# Patient Record
Sex: FEMALE | Race: WHITE | Employment: STUDENT | URBAN - METROPOLITAN AREA
[De-identification: names, ages, dates, MRNs, and addresses within clinical notes are randomized per-mention and may not be internally consistent; named-entity substitution may affect disease eponyms.]

---
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
## 2022-01-05 ENCOUNTER — E-VISIT (OUTPATIENT)
Dept: FAMILY MEDICINE CLINIC | Age: 22
End: 2022-01-05
Payer: COMMERCIAL

## 2022-01-05 DIAGNOSIS — B00.2 ORAL HERPES: Primary | ICD-10-CM

## 2022-01-05 PROCEDURE — 99423 OL DIG E/M SVC 21+ MIN: CPT | Performed by: FAMILY MEDICINE

## 2022-01-05 RX ORDER — ACYCLOVIR 50 MG/G
OINTMENT TOPICAL
Qty: 1 EACH | Refills: 1 | Status: SHIPPED | OUTPATIENT
Start: 2022-01-05 | End: 2022-01-12

## 2022-01-05 RX ORDER — VALACYCLOVIR HYDROCHLORIDE 1 G/1
1000 TABLET, FILM COATED ORAL 2 TIMES DAILY
Qty: 10 TABLET | Refills: 0 | Status: SHIPPED | OUTPATIENT
Start: 2022-01-05 | End: 2022-01-10

## 2022-01-05 NOTE — PROGRESS NOTES
HPI: per patient's questionnaire    EXAM: not applicable    Diagnoses and all orders for this visit:    1. Oral herpes    - valACYclovir (VALTREX) 1 g tablet; Take 1 tablet by mouth 2 times daily for 5 days  Dispense: 10 tablet; Refill: 0  - acyclovir (ZOVIRAX) 5 % ointment; Apply topically 5 times daily. Dispense: 1 each; Refill: 1      No orders of the defined types were placed in this encounter. Patient was advised to contact PCP if symptoms worsen or failing to change as expected        20 -30 minutes were spent on the digital evaluation and management of this patient.   Electronically signed by Milton Samano MD on 1/5/22 at  1:04 PM

## 2022-03-19 ENCOUNTER — APPOINTMENT (OUTPATIENT)
Dept: CT IMAGING | Age: 22
End: 2022-03-19
Payer: COMMERCIAL

## 2022-03-19 ENCOUNTER — HOSPITAL ENCOUNTER (EMERGENCY)
Age: 22
Discharge: HOME | End: 2022-03-19
Attending: EMERGENCY MEDICINE
Payer: COMMERCIAL

## 2022-03-19 VITALS
SYSTOLIC BLOOD PRESSURE: 140 MMHG | BODY MASS INDEX: 29.77 KG/M2 | DIASTOLIC BLOOD PRESSURE: 91 MMHG | WEIGHT: 168 LBS | RESPIRATION RATE: 15 BRPM | TEMPERATURE: 98.9 F | HEIGHT: 63 IN | HEART RATE: 84 BPM | OXYGEN SATURATION: 97 %

## 2022-03-19 DIAGNOSIS — R10.11 ABDOMINAL PAIN, RIGHT UPPER QUADRANT: ICD-10-CM

## 2022-03-19 DIAGNOSIS — N39.0 URINARY TRACT INFECTION IN FEMALE: Primary | ICD-10-CM

## 2022-03-19 LAB
ABSOLUTE EOS #: 0.2 K/UL (ref 0–0.4)
ABSOLUTE LYMPH #: 1.8 K/UL (ref 1.2–5.2)
ABSOLUTE MONO #: 0.4 K/UL (ref 0.1–1.3)
ALBUMIN SERPL-MCNC: 4.3 G/DL (ref 3.5–5.2)
ALP BLD-CCNC: 43 U/L (ref 35–104)
ALT SERPL-CCNC: 13 U/L (ref 5–33)
AMORPHOUS: ABNORMAL
ANION GAP SERPL CALCULATED.3IONS-SCNC: 11 MMOL/L (ref 9–17)
AST SERPL-CCNC: 15 U/L
BACTERIA: ABNORMAL
BASOPHILS # BLD: 1 % (ref 0–2)
BASOPHILS ABSOLUTE: 0.1 K/UL (ref 0–0.2)
BILIRUB SERPL-MCNC: 0.18 MG/DL (ref 0.3–1.2)
BILIRUBIN URINE: NEGATIVE
BUN BLDV-MCNC: 7 MG/DL (ref 6–20)
CALCIUM SERPL-MCNC: 9.2 MG/DL (ref 8.6–10.4)
CASTS UA: ABNORMAL /LPF
CHLORIDE BLD-SCNC: 102 MMOL/L (ref 98–107)
CO2: 25 MMOL/L (ref 20–31)
COLOR: YELLOW
CREAT SERPL-MCNC: 0.62 MG/DL (ref 0.5–0.9)
EOSINOPHILS RELATIVE PERCENT: 3 % (ref 0–4)
EPITHELIAL CELLS UA: ABNORMAL /HPF
GFR NON-AFRICAN AMERICAN: ABNORMAL ML/MIN
GFR SERPL CREATININE-BSD FRML MDRD: ABNORMAL ML/MIN/{1.73_M2}
GLUCOSE BLD-MCNC: 110 MG/DL (ref 70–99)
GLUCOSE URINE: NEGATIVE
HCG(URINE) PREGNANCY TEST: NEGATIVE
HCT VFR BLD CALC: 36 % (ref 36–46)
HEMOGLOBIN: 12.3 G/DL (ref 12–16)
KETONES, URINE: NEGATIVE
LEUKOCYTE ESTERASE, URINE: ABNORMAL
LIPASE: 30 U/L (ref 13–60)
LYMPHOCYTES # BLD: 29 % (ref 25–45)
MCH RBC QN AUTO: 30.7 PG (ref 26–34)
MCHC RBC AUTO-ENTMCNC: 34 G/DL (ref 31–37)
MCV RBC AUTO: 90.4 FL (ref 80–100)
MONOCYTES # BLD: 6 % (ref 2–8)
NITRITE, URINE: NEGATIVE
PDW BLD-RTO: 12.2 % (ref 11.5–14.9)
PH UA: 7.5 (ref 5–8)
PLATELET # BLD: 385 K/UL (ref 150–450)
PMV BLD AUTO: 6.5 FL (ref 6–12)
POTASSIUM SERPL-SCNC: 3.9 MMOL/L (ref 3.7–5.3)
PROTEIN UA: NEGATIVE
RBC # BLD: 3.99 M/UL (ref 4–5.2)
RBC UA: ABNORMAL /HPF
SEG NEUTROPHILS: 61 % (ref 34–64)
SEGMENTED NEUTROPHILS ABSOLUTE COUNT: 3.7 K/UL (ref 1.3–9.1)
SODIUM BLD-SCNC: 138 MMOL/L (ref 135–144)
SPECIFIC GRAVITY UA: 1.01 (ref 1–1.03)
TOTAL PROTEIN: 7.3 G/DL (ref 6.4–8.3)
TURBIDITY: ABNORMAL
URINE HGB: NEGATIVE
UROBILINOGEN, URINE: NORMAL
WBC # BLD: 6.1 K/UL (ref 4.5–13.5)
WBC UA: ABNORMAL /HPF

## 2022-03-19 PROCEDURE — 83690 ASSAY OF LIPASE: CPT

## 2022-03-19 PROCEDURE — 81001 URINALYSIS AUTO W/SCOPE: CPT

## 2022-03-19 PROCEDURE — 6360000004 HC RX CONTRAST MEDICATION: Performed by: EMERGENCY MEDICINE

## 2022-03-19 PROCEDURE — 74177 CT ABD & PELVIS W/CONTRAST: CPT

## 2022-03-19 PROCEDURE — 6360000002 HC RX W HCPCS: Performed by: EMERGENCY MEDICINE

## 2022-03-19 PROCEDURE — 85025 COMPLETE CBC W/AUTO DIFF WBC: CPT

## 2022-03-19 PROCEDURE — 36415 COLL VENOUS BLD VENIPUNCTURE: CPT

## 2022-03-19 PROCEDURE — 96374 THER/PROPH/DIAG INJ IV PUSH: CPT

## 2022-03-19 PROCEDURE — 96375 TX/PRO/DX INJ NEW DRUG ADDON: CPT

## 2022-03-19 PROCEDURE — 81025 URINE PREGNANCY TEST: CPT

## 2022-03-19 PROCEDURE — 99284 EMERGENCY DEPT VISIT MOD MDM: CPT

## 2022-03-19 PROCEDURE — 80053 COMPREHEN METABOLIC PANEL: CPT

## 2022-03-19 PROCEDURE — 2580000003 HC RX 258: Performed by: EMERGENCY MEDICINE

## 2022-03-19 RX ORDER — 0.9 % SODIUM CHLORIDE 0.9 %
1000 INTRAVENOUS SOLUTION INTRAVENOUS ONCE
Status: COMPLETED | OUTPATIENT
Start: 2022-03-19 | End: 2022-03-19

## 2022-03-19 RX ORDER — ONDANSETRON 2 MG/ML
4 INJECTION INTRAMUSCULAR; INTRAVENOUS ONCE
Status: COMPLETED | OUTPATIENT
Start: 2022-03-19 | End: 2022-03-19

## 2022-03-19 RX ORDER — 0.9 % SODIUM CHLORIDE 0.9 %
80 INTRAVENOUS SOLUTION INTRAVENOUS ONCE
Status: COMPLETED | OUTPATIENT
Start: 2022-03-19 | End: 2022-03-19

## 2022-03-19 RX ORDER — SODIUM CHLORIDE 0.9 % (FLUSH) 0.9 %
10 SYRINGE (ML) INJECTION PRN
Status: DISCONTINUED | OUTPATIENT
Start: 2022-03-19 | End: 2022-03-19 | Stop reason: HOSPADM

## 2022-03-19 RX ORDER — KETOROLAC TROMETHAMINE 30 MG/ML
30 INJECTION, SOLUTION INTRAMUSCULAR; INTRAVENOUS ONCE
Status: COMPLETED | OUTPATIENT
Start: 2022-03-19 | End: 2022-03-19

## 2022-03-19 RX ADMIN — ONDANSETRON 4 MG: 2 INJECTION, SOLUTION INTRAMUSCULAR; INTRAVENOUS at 02:41

## 2022-03-19 RX ADMIN — KETOROLAC TROMETHAMINE 30 MG: 30 INJECTION, SOLUTION INTRAMUSCULAR; INTRAVENOUS at 02:41

## 2022-03-19 RX ADMIN — SODIUM CHLORIDE 80 ML: 9 INJECTION, SOLUTION INTRAVENOUS at 04:26

## 2022-03-19 RX ADMIN — SODIUM CHLORIDE 1000 ML: 9 INJECTION, SOLUTION INTRAVENOUS at 02:41

## 2022-03-19 RX ADMIN — SODIUM CHLORIDE, PRESERVATIVE FREE 10 ML: 5 INJECTION INTRAVENOUS at 04:26

## 2022-03-19 RX ADMIN — IOPAMIDOL 75 ML: 755 INJECTION, SOLUTION INTRAVENOUS at 04:26

## 2022-03-19 ASSESSMENT — ENCOUNTER SYMPTOMS
NAUSEA: 0
SORE THROAT: 0
CONSTIPATION: 0
SHORTNESS OF BREATH: 0
ABDOMINAL PAIN: 1
TROUBLE SWALLOWING: 0
COLOR CHANGE: 0
COUGH: 0
BACK PAIN: 0
DIARRHEA: 0
VOMITING: 0
BLOOD IN STOOL: 0

## 2022-03-19 ASSESSMENT — PAIN DESCRIPTION - LOCATION: LOCATION: ABDOMEN

## 2022-03-19 ASSESSMENT — PAIN - FUNCTIONAL ASSESSMENT: PAIN_FUNCTIONAL_ASSESSMENT: 0-10

## 2022-03-19 ASSESSMENT — PAIN SCALES - GENERAL
PAINLEVEL_OUTOF10: 3
PAINLEVEL_OUTOF10: 3

## 2022-03-19 ASSESSMENT — PAIN DESCRIPTION - FREQUENCY: FREQUENCY: INTERMITTENT

## 2022-03-19 ASSESSMENT — PAIN DESCRIPTION - ORIENTATION: ORIENTATION: RIGHT

## 2022-03-19 ASSESSMENT — PAIN DESCRIPTION - PAIN TYPE: TYPE: ACUTE PAIN

## 2022-03-19 NOTE — ED PROVIDER NOTES
[address] [facility]  EMERGENCY DEPARTMENT ENCOUNTER    Pt Name: Deysi Ramírez  MRN: [id number]  YOB: 2000  Date of evaluation:3/19/22  PCP: Maria Luisa Herrera MD    [address]       Chief Complaint   Patient presents with    Abdominal Pain     patient has complaints of intermittent RUQ pain       HISTORY OF PRESENT ILLNESS    Deysi Ramírez is a 25 y.o. female who presents with a chief complaint of right upper quadrant abdominal pain. Patient states around 9:00 she developed the pain while she was going to bed. It ended up going away at that time but returned again later tonight. No nausea or vomiting. Pain is sharp and does not radiate. Nothing makes it better or worse. No changes in bowel or bladder habits. She did eat [employer] for dinner tonight. Her mother had problems with her gallbladder ended up having to have it removed. Symptoms are acute. Symptoms are moderate. Patient has no other complaints at this time. REVIEW OF SYSTEMS       Review of Systems   Constitutional: Negative for chills, fatigue and fever. HENT: Negative for congestion, ear pain, sore throat and trouble swallowing. Eyes: Negative for visual disturbance. Respiratory: Negative for cough and shortness of breath. Cardiovascular: Negative for chest pain, palpitations and leg swelling. Gastrointestinal: Positive for abdominal pain. Negative for blood in stool, constipation, diarrhea, nausea and vomiting. Genitourinary: Negative for dysuria and flank pain. Musculoskeletal: Negative for arthralgias, back pain, myalgias and neck pain. Skin: Negative for color change, rash and wound. Neurological: Negative for dizziness, weakness, light-headedness, numbness and headaches. Psychiatric/Behavioral: Negative for confusion. All other systems reviewed and are negative. Negative in 10 essential Systems except as mentioned above and in the HPI.         PAST MEDICAL HISTORY   No past medical history on file. None    SURGICAL HISTORY      has no past surgical history on file. None    CURRENT MEDICATIONS       Current Discharge Medication List      CONTINUE these medications which have NOT CHANGED    Details   norethindrone-ethinyl estradiol (EDI FE 1/20) 1-20 MG-MCG per tablet take 1 tablet by mouth once daily  Qty: 28 tablet, Refills: 11    Associated Diagnoses: Surveillance for birth control, oral contraceptives      doxycycline monohydrate (ADOXA) 100 MG tablet Take 1 tablet by mouth 2 times daily With food  Qty: 20 tablet, Refills: 0    Associated Diagnoses: Carbuncle             ALLERGIES     has No Known Allergies. FAMILY HISTORY     She indicated that the status of her other is unknown.     family history includes Diabetes in an other family member; High Blood Pressure in an other family member. Cholecystectomy in mother    SOCIAL HISTORY      reports that she has never smoked. She has never used smokeless tobacco. She reports that she does not drink alcohol and does not use drugs. PHYSICAL EXAM     INITIAL VITALS:  height is 5' 3\" (1.6 m) and weight is 168 lb (76.2 kg). Her oral temperature is 98.9 °F (37.2 °C). Her blood pressure is 140/91 (abnormal) and her pulse is 84. Her respiration is 15 and oxygen saturation is 97%. Physical Exam  Vitals and nursing note reviewed. Constitutional:       General: She is not in acute distress. HENT:      Head: Normocephalic and atraumatic. Eyes:      Conjunctiva/sclera: Conjunctivae normal.      Pupils: Pupils are equal, round, and reactive to light. Cardiovascular:      Rate and Rhythm: Normal rate and regular rhythm. Heart sounds: Normal heart sounds. No murmur heard. Pulmonary:      Effort: Pulmonary effort is normal. No respiratory distress. Breath sounds: Normal breath sounds. Abdominal:      General: Bowel sounds are normal. There is no distension. Palpations: Abdomen is soft. Tenderness:  There is abdominal tenderness in the right upper quadrant. Musculoskeletal:         General: No tenderness. Cervical back: Neck supple. Lymphadenopathy:      Cervical: No cervical adenopathy. Skin:     General: Skin is warm and dry. Findings: No rash. Neurological:      Mental Status: She is alert and oriented to person, place, and time. Psychiatric:         Judgment: Judgment normal.           DIFFERENTIAL DIAGNOSIS/MDM:   79-year-old female presents with right upper quadrant abdominal pain since this evening. She is afebrile, nontoxic, normal vital signs. No acute distress. Has mild tenderness to deep palpation in her right upper quadrant. No peritoneal signs. Differential includes biliary colic, cholecystitis, pancreatitis, gastritis. We will get labs, CT abdomen pelvis    DIAGNOSTIC RESULTS     EKG: All EKG's are interpreted by the Emergency Department Physician who either signs or Co-signs this chart in the absence of a cardiologist.        RADIOLOGY:   I directly visualized the following  images and reviewed the radiologist interpretations:  CT ABDOMEN PELVIS W IV CONTRAST Additional Contrast? None   Final Result   No acute abnormality identified.                  ED BEDSIDE ULTRASOUND:      LABS:  Labs Reviewed   CBC WITH AUTO DIFFERENTIAL - Abnormal; Notable for the following components:       Result Value    RBC 3.99 (*)     All other components within normal limits   COMPREHENSIVE METABOLIC PANEL - Abnormal; Notable for the following components:    Glucose 110 (*)     Total Bilirubin 0.18 (*)     All other components within normal limits   URINALYSIS WITH REFLEX TO CULTURE - Abnormal; Notable for the following components:    Turbidity UA Cloudy (*)     Leukocyte Esterase, Urine MOD (*)     All other components within normal limits   MICROSCOPIC URINALYSIS - Abnormal; Notable for the following components:    Bacteria, UA MODERATE (*)     Amorphous, UA 2+ (*)     All other components within normal limits   LIPASE   PREGNANCY, URINE         EMERGENCY DEPARTMENT COURSE:   Vitals:    Vitals:    03/19/22 0222   BP: (!) 140/91   Pulse: 84   Resp: 15   Temp: 98.9 °F (37.2 °C)   TempSrc: Oral   SpO2: 97%   Weight: 168 lb (76.2 kg)   Height: 5' 3\" (1.6 m)     Labs are unremarkable other than urine. She is found to have a UTI. When asked patient brought that she states she is actually been taking antibiotics that she had at home for UTI. States she has been on it for 2 days and has 3 more days left. Her CT scan is normal.  No evidence of biliary stones. No evidence of appendicitis. Advised her to finish the course of antibiotics, return if any symptoms worsen or if her symptoms do not begin to improve. She is agreeable plan will be discharged home today. CRITICALCARE:      CONSULTS:  None      PROCEDURES:      FINAL IMPRESSION      1. Urinary tract infection in female    2. Abdominal pain, right upper quadrant            DISPOSITION/PLAN   DISPOSITION Decision To Discharge 03/19/2022 04:47:18 AM          PATIENT REFERRED TO:  Lauro Salmeron MD  [address] [facility] [address]  [address] [address] [phone]    Schedule an appointment as soon as possible for a visit       [facility] ED  [facility] [address]  [address] [facility]  [phone]    As needed, If symptoms worsen      DISCHARGE MEDICATIONS:  Current Discharge Medication List          The care is provided during an unprecedented national emergency due to the novel coronavirus, COVID-19.     (Please note that portions ofthis note were completed with a voice recognition program.  Efforts were made to edit the dictations but occasionally words are mis-transcribed.)    Eliz Higgins DO  Attending Emergency Physician          Eliz Higgins DO  03/19/22 [id number]

## 2022-05-18 ENCOUNTER — OFFICE VISIT (OUTPATIENT)
Dept: FAMILY MEDICINE CLINIC | Age: 22
End: 2022-05-18
Payer: COMMERCIAL

## 2022-05-18 VITALS
HEIGHT: 63 IN | SYSTOLIC BLOOD PRESSURE: 118 MMHG | OXYGEN SATURATION: 98 % | WEIGHT: 179 LBS | TEMPERATURE: 98.4 F | HEART RATE: 82 BPM | DIASTOLIC BLOOD PRESSURE: 80 MMHG | BODY MASS INDEX: 31.71 KG/M2

## 2022-05-18 DIAGNOSIS — R10.11 RIGHT UPPER QUADRANT ABDOMINAL PAIN: Primary | ICD-10-CM

## 2022-05-18 DIAGNOSIS — K21.9 GASTROESOPHAGEAL REFLUX DISEASE WITHOUT ESOPHAGITIS: ICD-10-CM

## 2022-05-18 PROCEDURE — 99214 OFFICE O/P EST MOD 30 MIN: CPT | Performed by: FAMILY MEDICINE

## 2022-05-18 RX ORDER — OMEPRAZOLE 20 MG/1
20 CAPSULE, DELAYED RELEASE ORAL DAILY
Qty: 120 CAPSULE | Refills: 1 | Status: SHIPPED | OUTPATIENT
Start: 2022-05-18 | End: 2022-09-28 | Stop reason: DRUGHIGH

## 2022-05-18 SDOH — ECONOMIC STABILITY: FOOD INSECURITY: WITHIN THE PAST 12 MONTHS, THE FOOD YOU BOUGHT JUST DIDN'T LAST AND YOU DIDN'T HAVE MONEY TO GET MORE.: NEVER TRUE

## 2022-05-18 SDOH — ECONOMIC STABILITY: FOOD INSECURITY: WITHIN THE PAST 12 MONTHS, YOU WORRIED THAT YOUR FOOD WOULD RUN OUT BEFORE YOU GOT MONEY TO BUY MORE.: NEVER TRUE

## 2022-05-18 ASSESSMENT — ENCOUNTER SYMPTOMS
SHORTNESS OF BREATH: 0
RECTAL PAIN: 0
ABDOMINAL PAIN: 1
SINUS PRESSURE: 0
COUGH: 0
TROUBLE SWALLOWING: 0
BACK PAIN: 0
STRIDOR: 0
SORE THROAT: 0
ABDOMINAL DISTENTION: 1
RHINORRHEA: 0
DIARRHEA: 1
NAUSEA: 1
COLOR CHANGE: 0
VOMITING: 0
CHEST TIGHTNESS: 0
CONSTIPATION: 0
BLOOD IN STOOL: 0
WHEEZING: 0

## 2022-05-18 ASSESSMENT — PATIENT HEALTH QUESTIONNAIRE - PHQ9
SUM OF ALL RESPONSES TO PHQ QUESTIONS 1-9: 0
SUM OF ALL RESPONSES TO PHQ9 QUESTIONS 1 & 2: 0
SUM OF ALL RESPONSES TO PHQ QUESTIONS 1-9: 0
1. LITTLE INTEREST OR PLEASURE IN DOING THINGS: 0
SUM OF ALL RESPONSES TO PHQ QUESTIONS 1-9: 0
2. FEELING DOWN, DEPRESSED OR HOPELESS: 0
SUM OF ALL RESPONSES TO PHQ QUESTIONS 1-9: 0

## 2022-05-18 ASSESSMENT — SOCIAL DETERMINANTS OF HEALTH (SDOH): HOW HARD IS IT FOR YOU TO PAY FOR THE VERY BASICS LIKE FOOD, HOUSING, MEDICAL CARE, AND HEATING?: NOT VERY HARD

## 2022-05-18 NOTE — PROGRESS NOTES
Visit Information    Have you changed or started any medications since your last visit including any over-the-counter medicines, vitamins, or herbal medicines? no   Have you stopped taking any of your medications? Is so, why? -  no  Are you having any side effects from any of your medications? - no    Have you seen any other physician or provider since your last visit? yes   Have you had any other diagnostic tests since your last visit? yes    Have you been seen in the emergency room and/or had an admission in a hospital since we last saw you?  yes    Have you had your routine dental cleaning in the past 6 months?  yes      Do you have an active MyChart account? If no, what is the barrier?   Yes    Patient Care Team:  Real Beltran MD as PCP - General (Family Medicine)  Real Beltran MD as PCP - [facility]    Medical History Review  Past Medical, Family, and Social History reviewed and does contribute to the patient presenting condition    Health Maintenance   Topic Date Due    COVID-19 Vaccine (1) Never done    HIV screen  Never done    Chlamydia screen  Never done    Hepatitis C screen  Never done    Flu vaccine (Season Ended) 09/01/2022    Depression Screen  10/26/2022    DTaP/Tdap/Td vaccine (6 - Tdap) 08/04/2024    Hepatitis A vaccine  Completed    Hepatitis B vaccine  Completed    Hib vaccine  Completed    HPV vaccine  Completed    Varicella vaccine  Completed    Meningococcal (ACWY) vaccine  Completed    Pneumococcal 0-64 years Vaccine  Completed

## 2022-05-18 NOTE — PROGRESS NOTES
Chief Complaint   Patient presents with    Abdominal Pain     random gallbladder pain. About a month ago she was in the ER for the pain, she has been watching what she is eating. Happens in the middle of the night, and stays all night     Other     happens about once a week at least          [facility]  here today for follow up on chronic medical problems, go over labs and/or diagnostic studies, and medication refills. Abdominal Pain (random gallbladder pain. About a month ago she was in the ER for the pain, she has been watching what she is eating. Happens in the middle of the night, and stays all night ) and Other (happens about once a week at least )      HPI: Patient is scheduled for ER visit which she had in March for abdominal pain. Patient last seen in October, reports she had abdominal pain in the right upper quadrant and epigastric region which is going on since last few months and increased recently that she had to go to the ER. Patient reports the pain is dull achy burning sensation. It is intermittent worsening after taking hot spicy foods and also high-fiber diet salads broccoli. Patient had CT abdomen done in the ER which did not show anything also had blood work done which was stable. Patient denies any weight loss, denies any hematemesis. The pain is associated with nausea and decreased appetite. Patient reports she tried to change her diet but nothing helped. There is also a change in bowel movements complains of loose bowel stools. She denies any constipation any blood with stools. She denies any family history of Crohn's or ulcerative colitis. Patient denies any illicit drug use or any history of marijuana denies any excess caffeine intake or smoking history. CT abdomen  Lower Chest: No acute findings.       Organs: The liver, gallbladder, pancreas, spleen, adrenals and kidneys reveal   no acute findings.       GI/Bowel:  There is no bowel dilatation or wall thickening identified.  Normal   appendix.       Pelvis: No acute findings.       Peritoneum/Retroperitoneum: No free air or free fluid.  The aorta is normal   in caliber.  The visceral branches are patent. No lymphadenopathy.       Bones/Soft Tissues: No abnormality identified. /80   Pulse 82   Temp 98.4 °F (36.9 °C)   Ht 5' 3\" (1.6 m)   Wt 179 lb (81.2 kg)   LMP 04/13/2022 (Approximate)   SpO2 98%   BMI 31.71 kg/m²    Body mass index is 31.71 kg/m². Wt Readings from Last 3 Encounters:   05/18/22 179 lb (81.2 kg)   03/19/22 168 lb (76.2 kg) (91 %, Z= 1.33)*   10/26/21 173 lb 9.6 oz (78.7 kg) (93 %, Z= 1.47)*     * Growth percentiles are based on CDC (Girls, 2-20 Years) data. [x]Negative depression screening. PHQ Scores 5/18/2022 10/26/2021 1/6/2021 12/17/2019 7/18/2018   PHQ2 Score 0 0 0 0 0   PHQ9 Score 0 0 0 0 0      []1-4 = Minimal depression   []5-9 = Milddepression   []10-14 = Moderate depression   []15-19 = Moderately severe depression   []20-27 = Severe depression    Discussed testing with the patient and all questions fully answered.     Admission on 03/19/2022, Discharged on 03/19/2022   Component Date Value Ref Range Status    WBC 03/19/2022 6.1  4.5 - 13.5 k/uL Final    RBC 03/19/2022 3.99* 4.0 - 5.2 m/uL Final    Hemoglobin 03/19/2022 12.3  12.0 - 16.0 g/dL Final    Hematocrit 03/19/2022 36.0  36 - 46 % Final    MCV 03/19/2022 90.4  80 - 100 fL Final    MCH 03/19/2022 30.7  26 - 34 pg Final    MCHC 03/19/2022 34.0  31 - 37 g/dL Final    RDW 03/19/2022 12.2  11.5 - 14.9 % Final    Platelets 81/25/2325 385  150 - 450 k/uL Final    MPV 03/19/2022 6.5  6.0 - 12.0 fL Final    Seg Neutrophils 03/19/2022 61  34 - 64 % Final    Lymphocytes 03/19/2022 29  25 - 45 % Final    Monocytes 03/19/2022 6  2 - 8 % Final    Eosinophils % 03/19/2022 3  0 - 4 % Final    Basophils 03/19/2022 1  0 - 2 % Final    Segs Absolute 03/19/2022 3.70  1.3 - 9.1 k/uL Final    Absolute Lymph # 03/19/2022 1.80  1.2 - 5.2 k/uL Final    Absolute Mono # 03/19/2022 0.40  0.1 - 1.3 k/uL Final    Absolute Eos # 03/19/2022 0.20  0.0 - 0.4 k/uL Final    Basophils Absolute 03/19/2022 0.10  0.0 - 0.2 k/uL Final    Glucose 03/19/2022 110* 70 - 99 mg/dL Final    BUN 03/19/2022 7  6 - 20 mg/dL Final    CREATININE 03/19/2022 0.62  0.50 - 0.90 mg/dL Final    Calcium 03/19/2022 9.2  8.6 - 10.4 mg/dL Final    Sodium 03/19/2022 138  135 - 144 mmol/L Final    Potassium 03/19/2022 3.9  3.7 - 5.3 mmol/L Final    Chloride 03/19/2022 102  98 - 107 mmol/L Final    CO2 03/19/2022 25  20 - 31 mmol/L Final    Anion Gap 03/19/2022 11  9 - 17 mmol/L Final    Alkaline Phosphatase 03/19/2022 43  35 - 104 U/L Final    ALT 03/19/2022 13  5 - 33 U/L Final    AST 03/19/2022 15  <32 U/L Final    Total Bilirubin 03/19/2022 0.18* 0.3 - 1.2 mg/dL Final    Total Protein 03/19/2022 7.3  6.4 - 8.3 g/dL Final    Albumin 03/19/2022 4.3  3.5 - 5.2 g/dL Final    GFR Non-African American 03/19/2022 Pediatric GFR requires additional information. Refer to [facility] website for calculator. >60 mL/min Final    GFR Comment 03/19/2022        Final    Comment: Average GFR for <21years old not available. Chronic Kidney Disease:   <60 mL/min/1.73sq m  Kidney failure:   <15 mL/min/1.73sq m              eGFR calculated using average adult body mass.  Additional eGFR calculator available at:        iXpert[employer]br            Lipase 03/19/2022 30  13 - 60 U/L Final    Color, UA 03/19/2022 Yellow  Yellow Final    Turbidity UA 03/19/2022 Cloudy* Clear Final    Glucose, Ur 03/19/2022 NEGATIVE  NEGATIVE Final    Bilirubin Urine 03/19/2022 NEGATIVE  NEGATIVE Final    Ketones, Urine 03/19/2022 NEGATIVE  NEGATIVE Final    Specific [place], UA 03/19/2022 1.013  1.000 - 1.030 Final    Urine Hgb 03/19/2022 NEGATIVE  NEGATIVE Final    pH, UA 03/19/2022 7.5  5.0 - 8.0 Final    Protein, UA 03/19/2022 NEGATIVE NEGATIVE Final    Urobilinogen, Urine 03/19/2022 Normal  Normal Final    Nitrite, Urine 03/19/2022 NEGATIVE  NEGATIVE Final    Leukocyte Esterase, Urine 03/19/2022 MOD* NEGATIVE Final    HCG(Urine) Pregnancy Test 03/19/2022 NEGATIVE  NEGATIVE Final    Comment: Specimens with hCG levels near the threshold of the test (25 mIU/mL) may give a negative or   indeterminate result. In such cases, another test should be performed with a new specimen   in 48-72 hours. If early pregnancy is suspected clinically in this setting, correlation   with quantitative serum b-hCG level is suggested.       WBC, UA 03/19/2022 0 TO 2  /HPF Final    RBC, UA 03/19/2022 0 TO 2  /HPF Final    Casts UA 03/19/2022 3 to 5  /LPF Final    Epithelial Cells UA 03/19/2022 3 to 5  /HPF Final    Bacteria, UA 03/19/2022 MODERATE* None Corrected    CORRECTED ON 03/19 AT 0420: PREVIOUSLY REPORTED AS FEW    Amorphous, UA 03/19/2022 2+* None Final         Most recent labs reviewed:     Lab Results   Component Value Date    WBC 6.1 03/19/2022    HGB 12.3 03/19/2022    HCT 36.0 03/19/2022    MCV 90.4 03/19/2022     03/19/2022       @BRIEFLAB(NA,K,CL,CO2,BUN,CREATININE,GLUCOSE,CALCIUM)@     Lab Results   Component Value Date    ALT 13 03/19/2022    AST 15 03/19/2022    ALKPHOS 43 03/19/2022    BILITOT 0.18 (L) 03/19/2022       No results found for: TSHFT4, TSH    No results found for: CHOL  No results found for: TRIG  No results found for: HDL  No results found for: LDLCALC, LDLCHOLESTEROL  No results found for: LABVLDL, VLDL  No results found for: CHOLHDLRATIO    No results found for: LABA1C    No results found for: FVQZFYBC10    No results found for: FOLATE    No results found for: IRON, TIBC, FERRITIN    No results found for: VITD25          Current Outpatient Medications   Medication Sig Dispense Refill    omeprazole (PRILOSEC) 20 MG delayed release capsule Take 1 capsule by mouth Daily 120 capsule 1    norethindrone-ethinyl estradiol (EDI QUEVEDO 1/20) 1-20 MG-MCG per tablet take 1 tablet by mouth once daily 28 tablet 11     No current facility-administered medications for this visit. Social History     Socioeconomic History    Marital status: Single     Spouse name: Not on file    Number of children: Not on file    Years of education: Not on file    Highest education level: Not on file   Occupational History    Not on file   Tobacco Use    Smoking status: Never Smoker    Smokeless tobacco: Never Used   Substance and Sexual Activity    Alcohol use: No     Alcohol/week: 0.0 standard drinks    Drug use: No    Sexual activity: Yes     Partners: Male     Birth control/protection: OCP   Other Topics Concern    Not on file   Social History Narrative    Not on file     Social Determinants of Health     Financial Resource Strain: Low Risk     Difficulty of Paying Living Expenses: Not very hard   Food Insecurity: No Food Insecurity    Worried About Running Out of Food in the Last Year: Never true    Jason of Food in the Last Year: Never true   Transportation Needs:     Lack of Transportation (Medical): Not on file    Lack of Transportation (Non-Medical):  Not on file   Physical Activity:     Days of Exercise per Week: Not on file    Minutes of Exercise per Session: Not on file   Stress:     Feeling of Stress : Not on file   Social Connections:     Frequency of Communication with Friends and Family: Not on file    Frequency of Social Gatherings with Friends and Family: Not on file    Attends Sikhism Services: Not on file    Active Member of Clubs or Organizations: Not on file    Attends Club or Organization Meetings: Not on file    Marital Status: Not on file   Intimate Partner Violence:     Fear of Current or Ex-Partner: Not on file    Emotionally Abused: Not on file    Physically Abused: Not on file    Sexually Abused: Not on file   Housing Stability:     Unable to Pay for Housing in the Last Year: Not on file  Number of Places Lived in the Last Year: Not on file    Unstable Housing in the Last Year: Not on file     Counseling given: Not Answered        Family History   Problem Relation Age of Onset    High Blood Pressure Other     Diabetes Other              -rest of complaints with corresponding details per ROS    The patient's past medical, surgical, social, and family history as well as her current medications and allergies were reviewed as documented intoday's encounter. Review of Systems   Constitutional: Positive for activity change and appetite change. Negative for fatigue, fever and unexpected weight change. HENT: Negative for congestion, ear pain, postnasal drip, rhinorrhea, sinus pressure, sore throat and trouble swallowing. Eyes: Negative for visual disturbance. Respiratory: Negative for cough, chest tightness, shortness of breath, wheezing and stridor. Cardiovascular: Negative for chest pain, palpitations and leg swelling. Gastrointestinal: Positive for abdominal distention, abdominal pain, diarrhea and nausea. Negative for blood in stool, constipation, rectal pain and vomiting. Endocrine: Negative for polyuria. Genitourinary: Negative for difficulty urinating, flank pain, frequency and urgency. Musculoskeletal: Negative for arthralgias, back pain, gait problem, myalgias and neck pain. Skin: Negative for color change, rash and wound. Allergic/Immunologic: Negative for food allergies and immunocompromised state. Neurological: Negative for dizziness, speech difficulty, weakness, light-headedness, numbness and headaches. Psychiatric/Behavioral: Positive for decreased concentration. Negative for agitation, behavioral problems, dysphoric mood, hallucinations, sleep disturbance and suicidal ideas. The patient is nervous/anxious. Physical Exam  Vitals and nursing note reviewed. Constitutional:       General: She is not in acute distress.      Appearance: Normal appearance. She is well-developed. She is not diaphoretic. HENT:      Head: Normocephalic and atraumatic. Nose: Nose normal.   Eyes:      General:         Right eye: No discharge. Left eye: No discharge. Extraocular Movements: Extraocular movements intact. Conjunctiva/sclera: Conjunctivae normal.      Pupils: Pupils are equal, round, and reactive to light. Neck:      Thyroid: No thyromegaly. Cardiovascular:      Rate and Rhythm: Normal rate and regular rhythm. Heart sounds: Normal heart sounds. No murmur heard. Pulmonary:      Effort: Pulmonary effort is normal. No respiratory distress. Breath sounds: Normal breath sounds. No wheezing or rhonchi. Abdominal:      General: Bowel sounds are normal. There is distension. Palpations: Abdomen is soft. There is no mass. Tenderness: There is abdominal tenderness in the right upper quadrant and epigastric area. There is no right CVA tenderness, left CVA tenderness, guarding or rebound. Musculoskeletal:         General: No tenderness. Cervical back: Normal range of motion and neck supple. No rigidity or spasms. Normal range of motion. Thoracic back: No spasms. Normal range of motion. Lumbar back: No spasms. Normal range of motion. Lymphadenopathy:      Cervical: No cervical adenopathy. Skin:     Coloration: Skin is not jaundiced or pale. Findings: No bruising, erythema or rash. Neurological:      General: No focal deficit present. Mental Status: She is alert and oriented to person, place, and time. Cranial Nerves: No cranial nerve deficit. Sensory: No sensory deficit. Motor: No weakness or tremor. Coordination: Coordination normal.      Gait: Gait and tandem walk normal.      Deep Tendon Reflexes: Reflexes are normal and symmetric. Psychiatric:         Attention and Perception: Attention and perception normal. She is attentive.          Mood and Affect: Mood is not anxious or depressed. Affect is not tearful. Speech: She is communicative. Speech is not rapid and pressured, delayed or slurred. Behavior: Behavior normal. Behavior is not agitated or slowed. Thought Content: Thought content normal.         Judgment: Judgment normal.             ASSESSMENT AND PLAN      1. Right upper quadrant abdominal pain  Uncontrolled ultrasound gallbladder rule out cholelithiasis check for H. pylori start on Prilosec. - US GALLBLADDER RUQ; Future  - H. Pylori Antigen, Stool; Future  - omeprazole (PRILOSEC) 20 MG delayed release capsule; Take 1 capsule by mouth Daily  Dispense: 120 capsule; Refill: 1  - NARCISO Screen With Reflex; Future  - Sedimentation Rate; Future    2. Gastroesophageal reflux disease without esophagitis  Uncontrolled start on Prilosec, check for H. pylori ultrasound gallbladder. May need EGD if symptoms do not improve.  - H. Pylori Antigen, Stool; Future  - omeprazole (PRILOSEC) 20 MG delayed release capsule; Take 1 capsule by mouth Daily  Dispense: 120 capsule; Refill: 1  - NARCISO Screen With Reflex; Future  - Sedimentation Rate; Future      Orders Placed This Encounter   Procedures    US GALLBLADDER RUQ     This procedure can be scheduled via [employer]. Access your [facility] account by visiting [email or web address]. Standing Status:   Future     Standing Expiration Date:   5/18/2023     Order Specific Question:   Reason for exam:     Answer:   RUQ pain consistent with biliary colic    H.  Pylori Antigen, Stool     Standing Status:   Future     Standing Expiration Date:   5/18/2023    NARCISO Screen With Reflex     Standing Status:   Future     Standing Expiration Date:   5/18/2023    Sedimentation Rate     Standing Status:   Future     Standing Expiration Date:   5/18/2023         Medications Discontinued During This Encounter   Medication Reason    doxycycline monohydrate (ADOXA) 100 MG tablet LIST CLEANUP       MAISHA received counseling on the following healthy behaviors: nutrition, exercise and medication adherence  Reviewed prior labs and health maintenance  Continue current medications, diet and exercise. Discussed use, benefit, and side effects of prescribed medications. Barriers to medication compliance addressed. Patient given educational materials - see patient instructions  Was a self-tracking handout given in paper form or via [employer]? Yes    Requested Prescriptions     Signed Prescriptions Disp Refills    omeprazole (PRILOSEC) 20 MG delayed release capsule 120 capsule 1     Sig: Take 1 capsule by mouth Daily       All patient questions answered. Patient voiced understanding. Quality Measures    Body mass index is 31.71 kg/m². Elevated. Weight control planned discussed daily exercise regimen and Healthy diet and regular exercise. BP: 118/80 Blood pressure is normal. Treatment plan consists of Weight Reduction, DASH Eating Plan, Dietary Sodium Restriction and No treatment change needed. No results found for: LDLCALC, LDLCHOLESTEROL, LDLDIRECT (goal LDL reduction with dx if diabetes is 50% LDL reduction)      PHQ Scores 5/18/2022 10/26/2021 1/6/2021 12/17/2019 7/18/2018   PHQ2 Score 0 0 0 0 0   PHQ9 Score 0 0 0 0 0     Interpretation of Total Score Depression Severity: 1-4 = Minimal depression, 5-9 = Mild depression, 10-14 = Moderate depression, 15-19 = Moderately severe depression, 20-27 = Severe depression    The patient'spast medical, surgical, social, and family history as well as her   current medications and allergies were reviewed as documented in today's encounter. Medications, labs, diagnostic studies, consultations andfollow-up as documented in this encounter. Return in about 6 weeks (around 6/29/2022). Patient wasseen with total face to face time of 30 minutes. More than 50% of this visit was counseling and education.        Future Appointments   Date Time Provider Hannah Silvestre   6/29/2022 11:30 AM [facility] Earnestine Baird MD [facility] Jadiel Aguilar     This note was completed by using the assistance of a speech-recognition program. However, inadvertent computerized transcription errors may be present. Althoughevery effort was made to ensure accuracy, no guarantees can be provided that every mistake has been identified and corrected by editing.   Electronically signed by Alicia Rose MD on 5/18/2022  1:36 PM

## 2022-09-07 ENCOUNTER — TELEPHONE (OUTPATIENT)
Dept: FAMILY MEDICINE CLINIC | Age: 22
End: 2022-09-07

## 2022-09-07 NOTE — TELEPHONE ENCOUNTER
----- Message from Manuel Rodriguez sent at 9/6/2022 12:05 PM EDT -----  Subject: Appointment Request    Reason for Call: Established Patient Appointment needed: Semi-Routine Skin   Problem    QUESTIONS    Reason for appointment request? No appointments available during search     Additional Information for Provider? Anshu Connelly would like an appointment has   a possible cyst on private area. No appointments available showing. She   would like to see Armando Ho.  She can be reached at [phone] ok   to leave a message  ---------------------------------------------------------------------------  --------------  [phone]  [phone]; OK to leave message on voicemail  ---------------------------------------------------------------------------  --------------  SCRIPT ANSWERS  COVID Screen: Teresita Karimi

## 2022-09-21 ENCOUNTER — HOSPITAL ENCOUNTER (OUTPATIENT)
Age: 22
Discharge: HOME | End: 2022-09-21
Payer: COMMERCIAL

## 2022-09-21 DIAGNOSIS — R10.11 RIGHT UPPER QUADRANT ABDOMINAL PAIN: ICD-10-CM

## 2022-09-21 DIAGNOSIS — K21.9 GASTROESOPHAGEAL REFLUX DISEASE WITHOUT ESOPHAGITIS: ICD-10-CM

## 2022-09-21 LAB — SEDIMENTATION RATE, ERYTHROCYTE: 12 MM/HR (ref 0–20)

## 2022-09-21 PROCEDURE — 85652 RBC SED RATE AUTOMATED: CPT

## 2022-09-21 PROCEDURE — 36415 COLL VENOUS BLD VENIPUNCTURE: CPT

## 2022-09-21 PROCEDURE — 86225 DNA ANTIBODY NATIVE: CPT

## 2022-09-21 PROCEDURE — 86038 ANTINUCLEAR ANTIBODIES: CPT

## 2022-09-22 LAB
ANTI DNA DOUBLE STRANDED: 2.1 IU/ML
ANTI-NUCLEAR ANTIBODY (ANA): NEGATIVE
ENA ANTIBODIES SCREEN: 0.3 U/ML

## 2022-09-22 NOTE — PROGRESS NOTES
[facility] [address] [address] [address]  [address] [facility] [address] [address]  Dept: [address] [facility] (:  2000) is a 23 y.o. female. Patient is here for evaluation of the following chief complaint(s):  Chief Complaint   Patient presents with    Cyst     Patient states she has a possible cyst on her pelvic area    Abdominal Pain     Patient states pain is causing her not sleep well and with walking      SUBJECTIVE/OBJECTIVE:  MASHA Dempsey is a 23 y.o. female patient. Patient is an established patient of Dr. Dougie Greco . Patient has a known history of labial epidermoid cyst, right upper quadrant pain, GERD, and anxiety. LABIAL CYST  Patient reported some recurrent cyst on her left labia. Patient have had an had incision and drainage done-however, abscess-had been recurrent-she is noticing some abscess buildup and stop bleeding, and also starts to fill back again. Despite having had incision and drainage done by the PCP back in May. Patient reported some severe pain especially when it is very swollen. However, she had noticed a lot of abscess on her underwear this morning before her appointment today therefore the swelling have gone down quite a bit before her visit today. RUQ PAIN  Patient reports some ongoing and also worsening pain on her right upper quadrant that she reports is achy sometimes squeezing type of pain that radiates to her back. This is been going on for several weeks. Patient had seen her PCP about this. She had some lab work done but was not able to get her ultrasound done in time. She denies any nausea, vomiting, diarrhea or constipation. Patient denies any fever or chills. Patient was started on some omeprazole. She did have some mild heartburn but not terrible. Patient have not noticed any big difference with taking the omeprazole as far as the pain goes.     Patient had taking some ibuprofen and Tylenol with no Endocrine: Negative. Genitourinary:  Positive for genital sores and vaginal pain. Negative for vaginal bleeding. Musculoskeletal:  Positive for myalgias. Negative for arthralgias. Neurological:  Negative for headaches. Psychiatric/Behavioral:  Negative for sleep disturbance and suicidal ideas. The patient is nervous/anxious. Physical Exam  Vitals and nursing note reviewed. Exam conducted with a chaperone present. Constitutional:       Appearance: Normal appearance. HENT:      Head: Normocephalic. Nose: Nose normal.   Cardiovascular:      Rate and Rhythm: Normal rate and regular rhythm. Pulmonary:      Effort: Pulmonary effort is normal.      Breath sounds: Normal breath sounds. Genitourinary:     Labia:         Left: Lesion (tender) present. Comments: Dayanna Hill- Chaperoned  Skin:     General: Skin is warm and dry. Neurological:      Mental Status: She is alert and oriented to person, place, and time. Psychiatric:         Mood and Affect: Mood is anxious. MEDICAL HISTORY  History reviewed. No pertinent past medical history. MEDICATIONS  Prior to Visit Medications    Medication Sig Taking? Authorizing Provider   cephALEXin (KEFLEX) 500 MG capsule Take 1 capsule by mouth 3 times daily for 7 days Yes ABBY Bauer CNP   naproxen (NAPROSYN) 500 MG tablet Take 1 tablet by mouth 2 times daily (with meals) Yes ABBY Bauer CNP   omeprazole (PRILOSEC) 20 MG delayed release capsule Take 1 capsule by mouth Daily Yes Gonzalo Byrnes MD   norethindrone-ethinyl estradiol Fili Hernandez FE 1/20) 1-20 MG-MCG per tablet take 1 tablet by mouth once daily Yes Gonzalo Bynres MD     Controlled Substance Monitoring:  Acute and Chronic Pain Monitoring:   No flowsheet data found. ASSESSMENT/PLAN:  1. Epidermoid cyst of labia majora  Worsening  Consult surgeon  DISCUSSED AND ADVISED TO:  Apply antibiotic ointment sparingly to site for the next few days.    Keep open to air as much as possible. Apply dressing if needed. No pool, lakes, hot tubs until fully healed. Call for worsening redness, streaking, swelling, drainage, pain, and fever/chills. - cephALEXin (KEFLEX) 500 MG capsule; Take 1 capsule by mouth 3 times daily for 7 days  Dispense: 21 capsule; Refill: 0  - naproxen (NAPROSYN) 500 MG tablet; Take 1 tablet by mouth 2 times daily (with meals)  Dispense: 60 tablet; Refill: 0  - [address] [facility], [facility], General Surgery, [place]    2. Right upper quadrant abdominal pain  Failure to Improve  US  labs  - CBC with Auto Differential; Future  - Comprehensive Metabolic Panel; Future  - Lipase; Future  - Urinalysis with Reflex to Culture; Future  - Pregnancy, Urine; Future  - US ABDOMEN LIMITED; Future  - naproxen (NAPROSYN) 500 MG tablet; Take 1 tablet by mouth 2 times daily (with meals)  Dispense: 60 tablet; Refill: 0    3. Gastroesophageal reflux disease without esophagitis  Stable  Continue therapy  DISCUSSED AND ADVISED TO:  Avoid food triggers. Stop eating large meals close to bedtime  Don't eat meals too close to bedtime  Avoid ASA, NSAID's, caffeine, peppermints, alcohol and tobacco.  Report for worsening symptoms. 4. Anxiety about health  Failure to Improve  Discussed how to recognize anxiety. Advised to relieve tension with exercise or a massage. Advised to get enough rest.  Advised to avoid alcohol, caffeine, nicotine, and illegal drugs. Which can increase anxiety level and cause sleep problems. On this date 9/23/2022 I have spent 35 minutes reviewing previous notes, test results and face to face with the patient discussing the diagnosis and importance of compliance with the treatment plan as well as documenting on the day of the visit. Return in about 4 months (around 1/23/2023) for Chronic conditions, Appt w/ Dr. Spencer Castro.     This note was completed by using the assistance of a speech-recognition program. However, inadvertent computerized transcription errors may be present. Although every effort was made to ensure accuracy, no guarantees can be provided that every mistake has been identified and corrected by editing.   Electronically signed by ABBY Carolina CNP on 9/22/22 at 5:55 PM EDT     --ABBY Carolina - CNP

## 2022-09-23 ENCOUNTER — OFFICE VISIT (OUTPATIENT)
Dept: FAMILY MEDICINE CLINIC | Age: 22
End: 2022-09-23
Payer: COMMERCIAL

## 2022-09-23 ENCOUNTER — HOSPITAL ENCOUNTER (OUTPATIENT)
Age: 22
Setting detail: SPECIMEN
Discharge: HOME | End: 2022-09-23
Payer: COMMERCIAL

## 2022-09-23 VITALS
HEART RATE: 92 BPM | TEMPERATURE: 97.9 F | OXYGEN SATURATION: 98 % | DIASTOLIC BLOOD PRESSURE: 85 MMHG | SYSTOLIC BLOOD PRESSURE: 113 MMHG | WEIGHT: 180 LBS | BODY MASS INDEX: 31.89 KG/M2 | HEIGHT: 63 IN

## 2022-09-23 DIAGNOSIS — K21.9 GASTROESOPHAGEAL REFLUX DISEASE WITHOUT ESOPHAGITIS: ICD-10-CM

## 2022-09-23 DIAGNOSIS — N90.7 EPIDERMOID CYST OF LABIA MAJORA: Primary | ICD-10-CM

## 2022-09-23 DIAGNOSIS — F41.8 ANXIETY ABOUT HEALTH: ICD-10-CM

## 2022-09-23 DIAGNOSIS — R10.11 RIGHT UPPER QUADRANT ABDOMINAL PAIN: ICD-10-CM

## 2022-09-23 PROCEDURE — 87338 HPYLORI STOOL AG IA: CPT

## 2022-09-23 PROCEDURE — 99214 OFFICE O/P EST MOD 30 MIN: CPT | Performed by: FAMILY MEDICINE

## 2022-09-23 RX ORDER — NAPROXEN 500 MG/1
500 TABLET ORAL 2 TIMES DAILY WITH MEALS
Qty: 60 TABLET | Refills: 0 | Status: SHIPPED | OUTPATIENT
Start: 2022-09-23

## 2022-09-23 RX ORDER — CEPHALEXIN 500 MG/1
500 CAPSULE ORAL 3 TIMES DAILY
Qty: 21 CAPSULE | Refills: 0 | Status: SHIPPED | OUTPATIENT
Start: 2022-09-23 | End: 2022-09-30

## 2022-09-23 ASSESSMENT — PATIENT HEALTH QUESTIONNAIRE - PHQ9
1. LITTLE INTEREST OR PLEASURE IN DOING THINGS: 0
SUM OF ALL RESPONSES TO PHQ QUESTIONS 1-9: 0
SUM OF ALL RESPONSES TO PHQ9 QUESTIONS 1 & 2: 0
SUM OF ALL RESPONSES TO PHQ QUESTIONS 1-9: 0
2. FEELING DOWN, DEPRESSED OR HOPELESS: 0
SUM OF ALL RESPONSES TO PHQ QUESTIONS 1-9: 0
SUM OF ALL RESPONSES TO PHQ QUESTIONS 1-9: 0

## 2022-09-23 ASSESSMENT — ENCOUNTER SYMPTOMS
SHORTNESS OF BREATH: 0
RESPIRATORY NEGATIVE: 1
WHEEZING: 0
ABDOMINAL PAIN: 1

## 2022-09-23 NOTE — PATIENT INSTRUCTIONS
New Updates for [facility] MyChart/ [email or web address] ([facility]) MARTÍN    Thank you for choosing US to give you the best care! [employer] ([facility]) is always trying to think of new ways to help their patients. We are asking all patients to try out the new digital registration that is now available through your [facility] account or the new MARTÍN, [email or web address] ([facility]). Via the martín you're now able to update your personal and registration information prior to your upcoming appointment. This will save you time once you arrive at the office to check-in, not to mention your information remains safe!! Many other perks come from signing up for an account, such as:  Requesting refills  Scheduling an appointment  Completing an E-Visit  Sending a message to the office/provider  Having access to your medication list  Paying your bill/copay prior to your appointment  Scheduling your yearly mammogram  Review your test results    If you are not familiar with [facility] or the [email or web address] ([facility]) MARTÍN, please ask one of us and we will be happy to answer any questions or help you set-up your account.       Your [facility] office,  Theresa

## 2022-09-23 NOTE — LETTER
[facility]  [address]  [address] [facility] [address]  Phone: [phone]  Fax: [phone]    Shahida Asp ABBY - DIANE        September 23, 2022     Patient: Melody Mckeon   YOB: 2000   Date of Visit: 9/23/2022       To Whom it May Concern:    Melody Mckeon was seen in my clinic on 9/23/2022. She may return to work on September 24,2022. Please excuse her today September 23, 2022. .    If you have any questions or concerns, please don't hesitate to call.     Sincerely,         Armando Ho, ABBY - CNP

## 2022-09-24 LAB
DIRECT EXAM: POSITIVE
SPECIMEN DESCRIPTION: ABNORMAL

## 2022-09-26 ENCOUNTER — TELEPHONE (OUTPATIENT)
Dept: FAMILY MEDICINE CLINIC | Age: 22
End: 2022-09-26

## 2022-09-26 NOTE — TELEPHONE ENCOUNTER
Received call from Dr Kolby Kaminski office that she will not be able to assist patient and she suggests patient to see OBGYN since patient is pregnant.

## 2022-09-26 NOTE — TELEPHONE ENCOUNTER
She is not pregnant. Unless she tested somewhere else. The pregnancy test I ordered was pending which was just a routine since she had an abdominal pain.  I do not understand

## 2022-09-28 DIAGNOSIS — B96.81 HELICOBACTER PYLORI GASTRITIS: Primary | ICD-10-CM

## 2022-09-28 DIAGNOSIS — K29.70 HELICOBACTER PYLORI GASTRITIS: Primary | ICD-10-CM

## 2022-09-28 RX ORDER — CLARITHROMYCIN 500 MG/1
500 TABLET, COATED ORAL 2 TIMES DAILY
Qty: 28 TABLET | Refills: 0 | Status: SHIPPED | OUTPATIENT
Start: 2022-09-28 | End: 2022-10-12

## 2022-09-28 RX ORDER — METRONIDAZOLE 500 MG/1
500 TABLET ORAL 3 TIMES DAILY
Qty: 42 TABLET | Refills: 0 | Status: SHIPPED | OUTPATIENT
Start: 2022-09-28 | End: 2022-10-12

## 2022-09-28 RX ORDER — OMEPRAZOLE 40 MG/1
40 CAPSULE, DELAYED RELEASE ORAL
Qty: 90 CAPSULE | Refills: 1 | Status: SHIPPED | OUTPATIENT
Start: 2022-09-28 | End: 2022-10-04

## 2022-09-29 DIAGNOSIS — N90.7 EPIDERMOID CYST OF LABIA MAJORA: Primary | ICD-10-CM

## 2022-09-29 NOTE — TELEPHONE ENCOUNTER
Pt called office back, she stated that no she Is not pregnant. She did also complete blood work. Results still have not came back.  She did at [facility]

## 2022-09-29 NOTE — TELEPHONE ENCOUNTER
Patient called and was given message, she stated that she will call this new placed and set up an appt. Also she stated that she did get blood WORK DONE AT [facility] THE [facility]. SHE STATED THAT SHE WILL REDO LAB WORK.

## 2022-09-29 NOTE — TELEPHONE ENCOUNTER
SPOKE WITH RAMSES AT DR GARZA [facility] AT [facility] OFFICE STATES SHE DIDN'T SAY THAT PATIENT WAS PREGNANT, STATES SPOKE OF PROVIDER UNABLE TO SEE PATIENT DUE TO THE LOCATION OF THE CYST AND THAT THE OBGYN REFERRAL SHOULD BE PLACED FOR THIS AREA

## 2022-09-29 NOTE — TELEPHONE ENCOUNTER
[facility] on status of results. As patient is concerned and worried of lab results, along with pregnancy test. Of what this office  was saying of why her surgery will need to be cancelled. Due to \"her being pregnant\". They stated they will look further into her labs and call our office back.

## 2022-09-29 NOTE — TELEPHONE ENCOUNTER
I sent a referral to Dr Mey Patel for inclusion cyst. Please share contact information to patient. Thanks.

## 2022-09-29 NOTE — TELEPHONE ENCOUNTER
[address] [facility] Clinical Support Pool  Subject: Message to Provider     QUESTIONS   Information for Provider? pt is calling about some questions she had   regarding the martín that was canceled the martín was fri 9/30/2022 for a cyst   to be removed martín got canceled due to them saying that she is pregnant she   is not aware that she is pregnant she does not know if she needs to take a   test and she says that she is on birth control she had a list of test that   were done a blood test that has not came back and a stool test that was   done no other test was given   ---------------------------------------------------------------------------   --------------   Sarah YOST   [phone]; OK to leave message on voicemail   ---------------------------------------------------------------------------   --------------   SCRIPT ANSWERS   Relationship to Patient?  Self

## 2022-09-30 ENCOUNTER — HOSPITAL ENCOUNTER (OUTPATIENT)
Dept: ULTRASOUND IMAGING | Age: 22
Discharge: HOME | End: 2022-10-02
Payer: COMMERCIAL

## 2022-09-30 ENCOUNTER — TELEPHONE (OUTPATIENT)
Dept: FAMILY MEDICINE CLINIC | Age: 22
End: 2022-09-30

## 2022-09-30 ENCOUNTER — HOSPITAL ENCOUNTER (OUTPATIENT)
Age: 22
Discharge: HOME | End: 2022-09-30
Payer: COMMERCIAL

## 2022-09-30 DIAGNOSIS — R10.11 RIGHT UPPER QUADRANT ABDOMINAL PAIN: ICD-10-CM

## 2022-09-30 DIAGNOSIS — K80.20 CALCULUS OF GALLBLADDER WITHOUT CHOLECYSTITIS WITHOUT OBSTRUCTION: Primary | ICD-10-CM

## 2022-09-30 LAB
ABSOLUTE EOS #: 0.1 K/UL (ref 0–0.4)
ABSOLUTE LYMPH #: 2 K/UL (ref 1.2–5.2)
ABSOLUTE MONO #: 0.4 K/UL (ref 0.1–1.3)
ALBUMIN SERPL-MCNC: 4.5 G/DL (ref 3.5–5.2)
ALP BLD-CCNC: 46 U/L (ref 35–104)
ALT SERPL-CCNC: 16 U/L (ref 5–33)
ANION GAP SERPL CALCULATED.3IONS-SCNC: 11 MMOL/L (ref 9–17)
AST SERPL-CCNC: 18 U/L
BACTERIA: NORMAL
BASOPHILS # BLD: 1 % (ref 0–2)
BASOPHILS ABSOLUTE: 0 K/UL (ref 0–0.2)
BILIRUB SERPL-MCNC: 0.3 MG/DL (ref 0.3–1.2)
BILIRUBIN URINE: NEGATIVE
BUN BLDV-MCNC: 11 MG/DL (ref 6–20)
CALCIUM SERPL-MCNC: 9.6 MG/DL (ref 8.6–10.4)
CASTS UA: NORMAL /LPF
CHLORIDE BLD-SCNC: 101 MMOL/L (ref 98–107)
CO2: 26 MMOL/L (ref 20–31)
COLOR: YELLOW
CREAT SERPL-MCNC: 0.6 MG/DL (ref 0.5–0.9)
EOSINOPHILS RELATIVE PERCENT: 2 % (ref 0–4)
EPITHELIAL CELLS UA: NORMAL /HPF
GFR AFRICAN AMERICAN: >60 ML/MIN
GFR NON-AFRICAN AMERICAN: >60 ML/MIN
GFR SERPL CREATININE-BSD FRML MDRD: NORMAL ML/MIN/{1.73_M2}
GLUCOSE BLD-MCNC: 78 MG/DL (ref 70–99)
GLUCOSE URINE: NEGATIVE
HCG(URINE) PREGNANCY TEST: NEGATIVE
HCT VFR BLD CALC: 36.5 % (ref 36–46)
HEMOGLOBIN: 12.5 G/DL (ref 12–16)
KETONES, URINE: NEGATIVE
LEUKOCYTE ESTERASE, URINE: ABNORMAL
LIPASE: 29 U/L (ref 13–60)
LYMPHOCYTES # BLD: 33 % (ref 25–45)
MCH RBC QN AUTO: 31.3 PG (ref 26–34)
MCHC RBC AUTO-ENTMCNC: 34.2 G/DL (ref 31–37)
MCV RBC AUTO: 91.7 FL (ref 80–100)
MONOCYTES # BLD: 6 % (ref 2–8)
NITRITE, URINE: NEGATIVE
PDW BLD-RTO: 12.4 % (ref 11.5–14.9)
PH UA: 5.5 (ref 5–8)
PLATELET # BLD: 388 K/UL (ref 150–450)
PMV BLD AUTO: 6.8 FL (ref 6–12)
POTASSIUM SERPL-SCNC: 4 MMOL/L (ref 3.7–5.3)
PROTEIN UA: NEGATIVE
RBC # BLD: 3.97 M/UL (ref 4–5.2)
RBC UA: NORMAL /HPF
SEG NEUTROPHILS: 58 % (ref 34–64)
SEGMENTED NEUTROPHILS ABSOLUTE COUNT: 3.5 K/UL (ref 1.3–9.1)
SODIUM BLD-SCNC: 138 MMOL/L (ref 135–144)
SPECIFIC GRAVITY UA: 1.02 (ref 1–1.03)
TOTAL PROTEIN: 7.7 G/DL (ref 6.4–8.3)
TURBIDITY: CLEAR
URINE HGB: NEGATIVE
UROBILINOGEN, URINE: NORMAL
WBC # BLD: 6.1 K/UL (ref 4.5–13.5)
WBC UA: NORMAL /HPF

## 2022-09-30 PROCEDURE — 80053 COMPREHEN METABOLIC PANEL: CPT

## 2022-09-30 PROCEDURE — 81001 URINALYSIS AUTO W/SCOPE: CPT

## 2022-09-30 PROCEDURE — 76705 ECHO EXAM OF ABDOMEN: CPT

## 2022-09-30 PROCEDURE — 81025 URINE PREGNANCY TEST: CPT

## 2022-09-30 PROCEDURE — 85025 COMPLETE CBC W/AUTO DIFF WBC: CPT

## 2022-09-30 PROCEDURE — 36415 COLL VENOUS BLD VENIPUNCTURE: CPT

## 2022-09-30 PROCEDURE — 83690 ASSAY OF LIPASE: CPT

## 2022-09-30 NOTE — TELEPHONE ENCOUNTER
Please let the patient know of the recent results. These can also be discussed further on the future visits. Patient's blood count is Essentially normal.  Slight low red cell  Lab Results   Component Value Date    WBC 6.1 09/30/2022    HGB 12.5 09/30/2022    HCT 36.5 09/30/2022    MCV 91.7 09/30/2022     09/30/2022    LYMPHOPCT 33 09/30/2022    RBC 3.97 (L) 09/30/2022    MCH 31.3 09/30/2022    MCHC 34.2 09/30/2022    RDW 12.4 09/30/2022     PANCREATIC ENZYME NORMAL    NOT PREGNANT    Patient's kidney function is WNL      Patient's liver function is WNL  .   Lab Results   Component Value Date     09/30/2022    K 4.0 09/30/2022     09/30/2022    CO2 26 09/30/2022    BUN 11 09/30/2022    CREATININE 0.60 09/30/2022    GLUCOSE 78 09/30/2022    CALCIUM 9.6 09/30/2022    PROT 7.7 09/30/2022    LABALBU 4.5 09/30/2022    BILITOT 0.3 09/30/2022    ALKPHOS 46 09/30/2022    AST 18 09/30/2022    ALT 16 09/30/2022    LABGLOM >60 09/30/2022    GFRAA >60 09/30/2022       Patient's urinalysis results presence of white cells- will wait for culture  Lab Results   Component Value Date/Time    COLORU Yellow 09/30/2022 01:23 PM    NITRU NEGATIVE 09/30/2022 01:23 PM    LEUKOCYTESUR SMALL 09/30/2022 01:23 PM    GLUCOSEU NEGATIVE 09/30/2022 01:23 PM    KETUA NEGATIVE 09/30/2022 01:23 PM    UROBILINOGEN Normal 09/30/2022 01:23 PM    BILIRUBINUR NEGATIVE 09/30/2022 01:23 PM

## 2022-10-04 ENCOUNTER — TELEPHONE (OUTPATIENT)
Dept: SURGERY | Age: 22
End: 2022-10-04

## 2022-10-04 ENCOUNTER — OFFICE VISIT (OUTPATIENT)
Dept: OBGYN CLINIC | Age: 22
End: 2022-10-04
Payer: COMMERCIAL

## 2022-10-04 VITALS
BODY MASS INDEX: 32.25 KG/M2 | DIASTOLIC BLOOD PRESSURE: 70 MMHG | HEIGHT: 63 IN | SYSTOLIC BLOOD PRESSURE: 118 MMHG | WEIGHT: 182 LBS

## 2022-10-04 DIAGNOSIS — N90.89 VULVAR LESION: Primary | ICD-10-CM

## 2022-10-04 PROCEDURE — 99203 OFFICE O/P NEW LOW 30 MIN: CPT | Performed by: OBSTETRICS & GYNECOLOGY

## 2022-10-04 NOTE — TELEPHONE ENCOUNTER
10/4/22- Called patient (1st attempt) to schedule clinic visit. Patient's VM is full and cannot accept new messages at this time.

## 2022-10-04 NOTE — PROGRESS NOTES
Eliane Zamudio  10/4/2022      Eliane Zamudio is a 23 y.o. female       The patient was seen today. She was here to follow-up regarding her labs and diagnostics ordered at her last visit for the diagnosis of:    ICD-10-CM    1. Vulvar lesion  N90.89           Her bowels are regular and she is voiding without difficulty. The pt states saw pcp ~3-4 weeks ago with cyst on vuvla. She shaves in both directions and felt it was an ingrown hair. The PCP , per pt, unroofed the area and treatred her with ABX. She stated the contents were \"CHEESY\". All things cleared and she is here to day for fu on the same site. . One partner entire life with condoms. No discharge or odor. No FCRNV. Denies pain. History reviewed. No pertinent past medical history. History reviewed. No pertinent surgical history. Family History   Problem Relation Age of Onset    High Blood Pressure Other     Diabetes Other          Social History     Tobacco Use    Smoking status: Never    Smokeless tobacco: Never   Substance Use Topics    Alcohol use: No     Alcohol/week: 0.0 standard drinks    Drug use: No         MEDICATIONS:  Current Outpatient Medications   Medication Sig Dispense Refill    metroNIDAZOLE (FLAGYL) 500 MG tablet Take 1 tablet by mouth 3 times daily for 14 days 42 tablet 0    clarithromycin (BIAXIN) 500 MG tablet Take 1 tablet by mouth 2 times daily for 14 days 28 tablet 0    naproxen (NAPROSYN) 500 MG tablet Take 1 tablet by mouth 2 times daily (with meals) 60 tablet 0    norethindrone-ethinyl estradiol (EDI FE ) 1-20 MG-MCG per tablet take 1 tablet by mouth once daily 28 tablet 11     No current facility-administered medications for this visit. ALLERGIES:  Allergies as of 10/04/2022    (No Known Allergies)     Review Of Systems (11 point):  Constitutional: No fever, chills or malaise;  No weight change or fatigue  Head and Eyes: No vision changes, Headache, Dizziness or trauma in last 12 months  ENT ROS: No hearing, Tinnitis, sinus or taste problems  Hematological and Lymphatic ROS:No Lymphoma, Von Willebrand's, Hemophillia or Bleeding History  Psych ROS: No Depression, Homicidal thoughts,suicidal thoughts, or anxiety  Breast ROS: No breast abnormalities or lumps  Respiratory ROS: No SOB, Pneumoniae,Cough, or Pulmonary Embolism   Cardiovascular ROS: No Chest Pain with Exertion, Palpitations, Syncope, Edema, Arrhythmia  Gastrointestinal ROS: No Indigestion, Heartburn, Nausea, vomiting, Diarrhea, Constipation,or Bowel Changes; No Bloody Stools or melena  Genito-Urinary ROS: No Dysuria, Hematuria or Nocturia. No Urinary Incontinence or Vaginal Discharge; +Vulvar lesion hx  Musculoskeletal ROS: No Arthralgia, Arthritis,Gout,Osteoporosis or Rheumatism  Neurological ROS: No CVA, Migraines, Epilepsy, Seizure Hx, or Limb Weakness  Dermatological ROS: No Rash, Itching, Hives, Mole Changes or Cancer                                                      Blood pressure 118/70, height 5' 3\" (1.6 m), weight 182 lb (82.6 kg), last menstrual period 09/15/2022. Chaperone for Intimate Exam  Chaperone was offered and accepted as part of the rooming process. Chaperone: Alona        Abdomen: Soft non-tender; good bowel sounds. No guarding, rebound or rigidity. No CVA tenderness bilaterally. Extremities: No calf tenderness, DTR 2/4, and No edema bilaterally    Pelvic: Requested External inspection only-see Image. No inguinal adenopathy        The area had a 2 mm soft site that appeared to be healing without erythema or tenderness. There was no fullness and nothing to be expressed.            Diagnostics:  na        Lab Results:  Results for orders placed or performed during the hospital encounter of 09/30/22   CBC with Auto Differential   Result Value Ref Range    WBC 6.1 4.5 - 13.5 k/uL    RBC 3.97 (L) 4.0 - 5.2 m/uL    Hemoglobin 12.5 12.0 - 16.0 g/dL    Hematocrit 36.5 36 - 46 %    MCV 91.7 80 - 100 fL    MCH 31.3 26 - 34 pg    MCHC 34.2 31 - 37 g/dL    RDW 12.4 11.5 - 14.9 %    Platelets 962 001 - 700 k/uL    MPV 6.8 6.0 - 12.0 fL    Seg Neutrophils 58 34 - 64 %    Lymphocytes 33 25 - 45 %    Monocytes 6 2 - 8 %    Eosinophils % 2 0 - 4 %    Basophils 1 0 - 2 %    Segs Absolute 3.50 1.3 - 9.1 k/uL    Absolute Lymph # 2.00 1.2 - 5.2 k/uL    Absolute Mono # 0.40 0.1 - 1.3 k/uL    Absolute Eos # 0.10 0.0 - 0.4 k/uL    Basophils Absolute 0.00 0.0 - 0.2 k/uL   Comprehensive Metabolic Panel   Result Value Ref Range    Glucose 78 70 - 99 mg/dL    BUN 11 6 - 20 mg/dL    Creatinine 0.60 0.50 - 0.90 mg/dL    Calcium 9.6 8.6 - 10.4 mg/dL    Sodium 138 135 - 144 mmol/L    Potassium 4.0 3.7 - 5.3 mmol/L    Chloride 101 98 - 107 mmol/L    CO2 26 20 - 31 mmol/L    Anion Gap 11 9 - 17 mmol/L    Alkaline Phosphatase 46 35 - 104 U/L    ALT 16 5 - 33 U/L    AST 18 <32 U/L    Total Bilirubin 0.3 0.3 - 1.2 mg/dL    Total Protein 7.7 6.4 - 8.3 g/dL    Albumin 4.5 3.5 - 5.2 g/dL    GFR Non-African American >60 >60 mL/min    GFR African American >60 >60 mL/min    GFR Comment         Lipase   Result Value Ref Range    Lipase 29 13 - 60 U/L   Urinalysis with Reflex to Culture   Result Value Ref Range    Color, UA Yellow Yellow    Turbidity UA Clear Clear    Glucose, Ur NEGATIVE NEGATIVE    Bilirubin Urine NEGATIVE NEGATIVE    Ketones, Urine NEGATIVE NEGATIVE    Specific Gravity, UA 1.022 1.000 - 1.030    Urine Hgb NEGATIVE NEGATIVE    pH, UA 5.5 5.0 - 8.0    Protein, UA NEGATIVE NEGATIVE    Urobilinogen, Urine Normal Normal    Nitrite, Urine NEGATIVE NEGATIVE    Leukocyte Esterase, Urine SMALL (A) NEGATIVE   Pregnancy, Urine   Result Value Ref Range    HCG(Urine) Pregnancy Test NEGATIVE NEGATIVE   Microscopic Urinalysis   Result Value Ref Range    WBC, UA 3 to 5 /HPF    RBC, UA 0 TO 2 /HPF    Casts UA 0 TO 2 /LPF    Epithelial Cells UA 6 TO 9 /HPF    Bacteria, UA None None           Assessment:   Diagnosis Orders   1.  Vulvar lesion Chief Complaint   Patient presents with    Established New Doctor    Other     Labial cyst          Patient Active Problem List    Diagnosis Date Noted    Helicobacter pylori gastritis 09/28/2022     Priority: Medium    Epidermoid cyst of labia majora 10/26/2021    Surveillance for birth control, oral contraceptives 01/06/2021    Influenza vaccine refused 01/06/2021       PLAN:  Return in about 7 months (around 5/17/2023) for Annual with cultures. I reviewed shaving  I discussed warm compresses and topical abx otc ointment  Pt to return to office if re-occurs  Annual 5/2023  Barrier recommendations and STD counseling was completed  Counseled on preventative health maintenance follow-up. No orders of the defined types were placed in this encounter. The patient, Mainor Coughlin is a 23 y.o. female, was seen with a total time spent of 25 minutes for the visit on this date of service by the E/M provider. The time component had both face to face and non face to face time spent in determining the total time component. Counseling and education regarding her diagnosis listed below and her options regarding those diagnoses were also included in determining her time component. Diagnosis Orders   1. Vulvar lesion             The patient had her preventative health maintenance recommendations and follow-up reviewed with her at the completion of her visit.

## 2022-10-06 NOTE — TELEPHONE ENCOUNTER
10/6/22- Called patient (1st attempt) to schedule clinic visit. Patient's VM is full and cannot accept new messages at this time.

## 2022-10-18 ENCOUNTER — OFFICE VISIT (OUTPATIENT)
Dept: SURGERY | Age: 22
End: 2022-10-18
Payer: COMMERCIAL

## 2022-10-18 ENCOUNTER — TELEPHONE (OUTPATIENT)
Dept: SURGERY | Age: 22
End: 2022-10-18

## 2022-10-18 VITALS
SYSTOLIC BLOOD PRESSURE: 125 MMHG | OXYGEN SATURATION: 100 % | DIASTOLIC BLOOD PRESSURE: 89 MMHG | HEART RATE: 108 BPM | WEIGHT: 182 LBS | BODY MASS INDEX: 32.25 KG/M2 | HEIGHT: 63 IN | RESPIRATION RATE: 16 BRPM

## 2022-10-18 DIAGNOSIS — K80.20 SYMPTOMATIC CHOLELITHIASIS: Primary | ICD-10-CM

## 2022-10-18 PROCEDURE — 99204 OFFICE O/P NEW MOD 45 MIN: CPT | Performed by: SURGERY

## 2022-10-18 NOTE — LETTER
[facility]  Surgical Specialties - General Surgery  [address]  [place]  Phone: [phone]  Fax: [phone]      10/18/22    Patient: Rosa Rehman  MRN: [id number]  : 2000  Date of visit: 10/18/2022    Dear Marylin Saldaña MD:      Thank you for requesting consultation for Rosa Rehman in regards to evaluation for symptomatic cholelithiasis, we will schedule her for surgery. Below are the relevant portions of my assessment and plan of care. If you have questions, please do not hesitate to call me. I look forward to following Rosa Rehman along with you.     Sincerely,        Yaima Mc, DO

## 2022-10-18 NOTE — PROGRESS NOTES
[id number] [facility] Surgery  History & Physical  Kalli Mclean DO    Pt Name: Rosario Rudd  MRN: [id number]  YOB: 2000  Date of evaluation: 10/18/2022  Primary Care Physician: Caryn Prater MD    Chief Complaint: symptomatic cholelithiasis      SUBJECTIVE:    History of Present Illness: This is a 23 y.o.  female who presents for evaluation for the above, her mother is also present. Pt reports RUQ pain without specific trigger that sometimes radiates to the R flank, no prior abdominal surgeries. RUQ US significant for cholelithiasis without other pathology. Chart review performed to add information to the HPI: Yes    Past Medical History   has no past medical history on file. Past Surgical History   has no past surgical history on file. Family History  family history includes Diabetes in an other family member; High Blood Pressure in an other family member. Social History  Tobacco use:  reports that she has never smoked. She has never used smokeless tobacco.  Alcohol use:  reports no history of alcohol use. Drug use:  reports no history of drug use. Medications  Current Medications:   Current Outpatient Medications   Medication Sig Dispense Refill    naproxen (NAPROSYN) 500 MG tablet Take 1 tablet by mouth 2 times daily (with meals) 60 tablet 0    norethindrone-ethinyl estradiol (EDI FE 1/20) 1-20 MG-MCG per tablet take 1 tablet by mouth once daily 28 tablet 11     No current facility-administered medications for this visit. Home Medications:   Prior to Admission medications    Medication Sig Start Date End Date Taking? Authorizing Provider   naproxen (NAPROSYN) 500 MG tablet Take 1 tablet by mouth 2 times daily (with meals) 9/23/22  Yes ABBY Bauer - CNP   norethindrone-ethinyl estradiol (EDI FE 1/20) 1-20 MG-MCG per tablet take 1 tablet by mouth once daily 12/6/21  Yes Caryn Prater MD       Allergies  Patient has no known allergies.       Review of Systems:  General: Denies any fever, chills. Eyes: Denies any changes in vision, diplopia or eye pain  Ears, Nose, Mouth: Denies changes in hearing/tinnitus or drainage from ears, no rhinorrhea or bloody nose, no difficulty chewing  Throat: no difficulty swallowing, no throat pain  Respiratory: Denies any shortness of breath or cough. Cardiac: Denies any chest pain, palpitations, claudication or edema. Gastrointestinal: RUQ pain  Genitourinary: Denies any frequency, urgency, hesitancy or incontinence. Musculoskeletal: Denies worsening muscle weakness or recent trauma  Skin: Denies rashes or lesions  Psychiatric: Denies any recent changes in mood or affect  Hematologic: Denies bruising or bleeding easily. PHYSICAL EXAMINATION  Vitals:   Vitals:    10/18/22 1311   BP: 125/89   Pulse: (!) 108   Resp: 16   SpO2: 100%       General Appearance:  awake, alert, no acute distress, well developed, well nourished   Skin:  Skin color, texture, turgor normal. No rashes or lesions. Head/face:  NCAT, face symmetrical  Eyes:  PERRL, no evidence of conjunctivitis or ptosis bilaterally  Ears:  External ears and canals grossly normal, no evidence of otorrhea. Nose/Sinuses:  Nares normal. Septum midline. Mucosa normal. No external drainage noted. Mouth/Neck:  Mucosa moist.  No external oral lesions. Trachea midline. No visible masses. Lungs:  Normal chest expansion, unlabored breathing without accessory muscle use. No audible rales, rhonchi, or wheezing. Cardiovascular: S1S2. No evidence of JVD. No evidence of pulsatile masses in abdomen  Abdomen:  Soft, non-tender, no organomegaly, no masses. Musculoskeletal: No evidence of bony/muscular deformities, trauma, atrophy of either left/right upper/lower extremity. No evidence of digital clubbing or cyanosis. Neurologic:  CN 2-12 grossly intact without obvious deficits. Grossly normal sensation in all extremities.   Psychiatric: appropriate judgement and insight, appropriate recall of recent and remote memory, no evidence of depression/anxiety/agitation      RADIOLOGY:  The following images and/or reports were personally reviewed with the following significant findings pertinent to the Chief Complaint and/or HPI:     [address] [place] organ? LIVER, GALLBLADDER, PANCREAS    Result Date: 9/30/2022  EXAMINATION: RIGHT UPPER QUADRANT ULTRASOUND 9/30/2022 1:25 pm COMPARISON: CT 03/19/2022 HISTORY: ORDERING SYSTEM PROVIDED HISTORY: Right upper quadrant abdominal pain TECHNOLOGIST PROVIDED HISTORY: ruq pain Specify organ?->LIVER Specify organ?->GALLBLADDER Specify organ?->PANCREAS FINDINGS: LIVER:  The liver demonstrates normal echogenicity without evidence of intrahepatic biliary ductal dilatation. Flow in the main portal vein is hepatopetal.  Liver length is 14.4 cm. BILIARY SYSTEM:  The gallbladder contains few shadowing gallstones, the largest measuring up to 1.9 cm, without sonographic findings of cholecystitis. There is no wall thickening or pericholecystic fluid. The patient did not have a positive sonographic Jackson's sign. Common bile duct is within normal limits measuring 3.3 mm. RIGHT KIDNEY: The right kidney is grossly unremarkable without evidence of hydronephrosis. PANCREAS:  Visualized portions of the pancreas are unremarkable. Pancreas is partially obscured by overlying bowel gas. OTHER: No evidence of right upper quadrant ascites. Cholelithiasis          DIAGNOSES:   Diagnosis Orders   1. Symptomatic cholelithiasis            PLAN:  Pt would like to proceed with cholecystectomy. Risks include bleeding, infection, scarring, bile leak, damage to surrounding tissues, chance of damage to the common bile duct, risk of subtotal cholecystectomy, conversion to open procedure, need for further surgery or procedures. Benefits, alternatives, complications and procedure details were explained to the patient, all questions were answered.         Medical Decision Making: moderate complexity    Electronically signed by Juliet Mckenna DO on 10/18/2022 at 1:25 PM

## 2022-10-19 NOTE — PROGRESS NOTES
DAY OF SURGERY/PROCEDURE  GUIDELINES    As a patient at the [facility], you can expect quality medical and nursing care that is centered on your individual needs. It is our goal to make your surgical experience as comfortable and excellent as possible.  ________________________________________________________________________    The following instructions are general guidelines, if any information on this sheet is different from what your doctor has instructed you to do, please follow your doctor's instructions. Please arrive on 10/25 @ 1145      Enter through entrance C. Check in at registration     Upon arrival you will be taken to the pre-operative area to get ready for surgery, your family will stay in the waiting room and visit with you once you are ready for surgery. Due to special limitations please limit visitation to 1-2 members of your family at a time. When it is time for surgery your family will return to the waiting room. Nothing to eat, drink, smoke, suck or chew after midnight (no water, gum, mints, cigarettes, cigars, pipes, snuff, chewing tobacco, etc.) or your surgery may be canceled. Take a shower or bath on the morning of your surgery/procedure (Hibiclens if directed)    Brush your teeth, but do not swallow any water    IN CASE OF ILLNESS - If you have a cold or flu symptoms (high fever, runny nose, sore throat, cough, etc.) rash, nausea, vomiting, loose stools, and/or recent contact with someone who has a contagious disease (chick pox, measles, etc.) please call your doctor before coming to the surgery center    DO NOT take anticoagulants (blood thinners, aspirin or aspirin-containing products) as instructed by your physician. Wear loose, comfortable clothing that is easy to put on and take off. They will remain in post-op with the nurse.     If you will be returning home the same day as your surgery, you will need to have a responsible adult (25years of age or older) present to drive you home. You will need someone stay with you at home for the first 24 hours following your surgery. This is due to the anesthesia and the medication given to you during surgery and recovery.

## 2022-10-24 ENCOUNTER — ANESTHESIA EVENT (OUTPATIENT)
Dept: OPERATING ROOM | Age: 22
End: 2022-10-24

## 2022-10-25 ENCOUNTER — ANESTHESIA (OUTPATIENT)
Dept: OPERATING ROOM | Age: 22
End: 2022-10-25

## 2022-10-25 ENCOUNTER — HOSPITAL ENCOUNTER (OUTPATIENT)
Age: 22
Setting detail: OUTPATIENT SURGERY
Discharge: HOME | End: 2022-10-25
Attending: SURGERY | Admitting: SURGERY
Payer: COMMERCIAL

## 2022-10-25 VITALS
OXYGEN SATURATION: 98 % | HEIGHT: 64 IN | SYSTOLIC BLOOD PRESSURE: 134 MMHG | RESPIRATION RATE: 22 BRPM | BODY MASS INDEX: 30.73 KG/M2 | DIASTOLIC BLOOD PRESSURE: 99 MMHG | WEIGHT: 180 LBS | TEMPERATURE: 97.2 F | HEART RATE: 105 BPM

## 2022-10-25 DIAGNOSIS — K80.20 SYMPTOMATIC CHOLELITHIASIS: Primary | ICD-10-CM

## 2022-10-25 LAB — HCG, PREGNANCY URINE (POC): NEGATIVE

## 2022-10-25 PROCEDURE — 2500000003 HC RX 250 WO HCPCS: Performed by: SURGERY

## 2022-10-25 PROCEDURE — 6370000000 HC RX 637 (ALT 250 FOR IP)

## 2022-10-25 PROCEDURE — S2900 ROBOTIC SURGICAL SYSTEM: HCPCS | Performed by: SURGERY

## 2022-10-25 PROCEDURE — 3600000009 HC SURGERY ROBOT BASE: Performed by: SURGERY

## 2022-10-25 PROCEDURE — 88304 TISSUE EXAM BY PATHOLOGIST: CPT

## 2022-10-25 PROCEDURE — 6360000002 HC RX W HCPCS: Performed by: ANESTHESIOLOGY

## 2022-10-25 PROCEDURE — 3700000001 HC ADD 15 MINUTES (ANESTHESIA): Performed by: SURGERY

## 2022-10-25 PROCEDURE — 2500000003 HC RX 250 WO HCPCS

## 2022-10-25 PROCEDURE — 7100000001 HC PACU RECOVERY - ADDTL 15 MIN: Performed by: SURGERY

## 2022-10-25 PROCEDURE — 2580000003 HC RX 258: Performed by: SURGERY

## 2022-10-25 PROCEDURE — 2580000003 HC RX 258: Performed by: ANESTHESIOLOGY

## 2022-10-25 PROCEDURE — 7100000010 HC PHASE II RECOVERY - FIRST 15 MIN: Performed by: SURGERY

## 2022-10-25 PROCEDURE — 47562 LAPAROSCOPIC CHOLECYSTECTOMY: CPT | Performed by: SURGERY

## 2022-10-25 PROCEDURE — 7100000011 HC PHASE II RECOVERY - ADDTL 15 MIN: Performed by: SURGERY

## 2022-10-25 PROCEDURE — 2580000003 HC RX 258

## 2022-10-25 PROCEDURE — 7100000000 HC PACU RECOVERY - FIRST 15 MIN: Performed by: SURGERY

## 2022-10-25 PROCEDURE — 6360000002 HC RX W HCPCS

## 2022-10-25 PROCEDURE — 6360000002 HC RX W HCPCS: Performed by: SURGERY

## 2022-10-25 PROCEDURE — 81025 URINE PREGNANCY TEST: CPT

## 2022-10-25 PROCEDURE — 2709999900 HC NON-CHARGEABLE SUPPLY: Performed by: SURGERY

## 2022-10-25 PROCEDURE — 3700000000 HC ANESTHESIA ATTENDED CARE: Performed by: SURGERY

## 2022-10-25 PROCEDURE — 3600000019 HC SURGERY ROBOT ADDTL 15MIN: Performed by: SURGERY

## 2022-10-25 RX ORDER — LABETALOL HYDROCHLORIDE 5 MG/ML
10 INJECTION, SOLUTION INTRAVENOUS
Status: DISCONTINUED | OUTPATIENT
Start: 2022-10-25 | End: 2022-10-25 | Stop reason: HOSPADM

## 2022-10-25 RX ORDER — SODIUM CHLORIDE 0.9 % (FLUSH) 0.9 %
5-40 SYRINGE (ML) INJECTION PRN
Status: DISCONTINUED | OUTPATIENT
Start: 2022-10-25 | End: 2022-10-25 | Stop reason: HOSPADM

## 2022-10-25 RX ORDER — MORPHINE SULFATE 2 MG/ML
2 INJECTION, SOLUTION INTRAMUSCULAR; INTRAVENOUS EVERY 5 MIN PRN
Status: DISCONTINUED | OUTPATIENT
Start: 2022-10-25 | End: 2022-10-25 | Stop reason: HOSPADM

## 2022-10-25 RX ORDER — MEPERIDINE HYDROCHLORIDE 50 MG/ML
12.5 INJECTION INTRAMUSCULAR; INTRAVENOUS; SUBCUTANEOUS EVERY 5 MIN PRN
Status: DISCONTINUED | OUTPATIENT
Start: 2022-10-25 | End: 2022-10-25 | Stop reason: HOSPADM

## 2022-10-25 RX ORDER — NEOSTIGMINE METHYLSULFATE 5 MG/5 ML
SYRINGE (ML) INTRAVENOUS PRN
Status: DISCONTINUED | OUTPATIENT
Start: 2022-10-25 | End: 2022-10-25 | Stop reason: SDUPTHER

## 2022-10-25 RX ORDER — DIPHENHYDRAMINE HYDROCHLORIDE 50 MG/ML
INJECTION INTRAMUSCULAR; INTRAVENOUS PRN
Status: DISCONTINUED | OUTPATIENT
Start: 2022-10-25 | End: 2022-10-25 | Stop reason: SDUPTHER

## 2022-10-25 RX ORDER — INDOCYANINE GREEN AND WATER 25 MG
KIT INJECTION
Status: COMPLETED
Start: 2022-10-25 | End: 2022-10-25

## 2022-10-25 RX ORDER — LIDOCAINE HYDROCHLORIDE 10 MG/ML
INJECTION, SOLUTION INFILTRATION; PERINEURAL
Status: DISCONTINUED
Start: 2022-10-25 | End: 2022-10-25 | Stop reason: HOSPADM

## 2022-10-25 RX ORDER — PROPOFOL 10 MG/ML
INJECTION, EMULSION INTRAVENOUS PRN
Status: DISCONTINUED | OUTPATIENT
Start: 2022-10-25 | End: 2022-10-25 | Stop reason: SDUPTHER

## 2022-10-25 RX ORDER — LIDOCAINE HYDROCHLORIDE 10 MG/ML
1 INJECTION, SOLUTION INFILTRATION; PERINEURAL
Status: DISCONTINUED | OUTPATIENT
Start: 2022-10-25 | End: 2022-10-25 | Stop reason: HOSPADM

## 2022-10-25 RX ORDER — HYDROCODONE BITARTRATE AND ACETAMINOPHEN 5; 325 MG/1; MG/1
TABLET ORAL
Status: COMPLETED
Start: 2022-10-25 | End: 2022-10-25

## 2022-10-25 RX ORDER — DIPHENHYDRAMINE HYDROCHLORIDE 50 MG/ML
12.5 INJECTION INTRAMUSCULAR; INTRAVENOUS
Status: DISCONTINUED | OUTPATIENT
Start: 2022-10-25 | End: 2022-10-25 | Stop reason: HOSPADM

## 2022-10-25 RX ORDER — OXYCODONE HYDROCHLORIDE AND ACETAMINOPHEN 5; 325 MG/1; MG/1
1 TABLET ORAL
Status: DISCONTINUED | OUTPATIENT
Start: 2022-10-25 | End: 2022-10-25 | Stop reason: HOSPADM

## 2022-10-25 RX ORDER — KETOROLAC TROMETHAMINE 30 MG/ML
INJECTION, SOLUTION INTRAMUSCULAR; INTRAVENOUS PRN
Status: DISCONTINUED | OUTPATIENT
Start: 2022-10-25 | End: 2022-10-25 | Stop reason: SDUPTHER

## 2022-10-25 RX ORDER — ROCURONIUM BROMIDE 10 MG/ML
INJECTION, SOLUTION INTRAVENOUS PRN
Status: DISCONTINUED | OUTPATIENT
Start: 2022-10-25 | End: 2022-10-25 | Stop reason: SDUPTHER

## 2022-10-25 RX ORDER — LIDOCAINE HYDROCHLORIDE 10 MG/ML
INJECTION, SOLUTION INFILTRATION; PERINEURAL PRN
Status: DISCONTINUED | OUTPATIENT
Start: 2022-10-25 | End: 2022-10-25 | Stop reason: SDUPTHER

## 2022-10-25 RX ORDER — FENTANYL CITRATE 50 UG/ML
INJECTION, SOLUTION INTRAMUSCULAR; INTRAVENOUS PRN
Status: DISCONTINUED | OUTPATIENT
Start: 2022-10-25 | End: 2022-10-25 | Stop reason: SDUPTHER

## 2022-10-25 RX ORDER — IPRATROPIUM BROMIDE AND ALBUTEROL SULFATE 2.5; .5 MG/3ML; MG/3ML
1 SOLUTION RESPIRATORY (INHALATION)
Status: DISCONTINUED | OUTPATIENT
Start: 2022-10-25 | End: 2022-10-25 | Stop reason: HOSPADM

## 2022-10-25 RX ORDER — GLYCOPYRROLATE 0.2 MG/ML
0.4 INJECTION INTRAMUSCULAR; INTRAVENOUS ONCE
Status: DISCONTINUED | OUTPATIENT
Start: 2022-10-25 | End: 2022-10-25 | Stop reason: HOSPADM

## 2022-10-25 RX ORDER — OXYCODONE HYDROCHLORIDE AND ACETAMINOPHEN 5; 325 MG/1; MG/1
2 TABLET ORAL
Status: DISCONTINUED | OUTPATIENT
Start: 2022-10-25 | End: 2022-10-25 | Stop reason: HOSPADM

## 2022-10-25 RX ORDER — ONDANSETRON 4 MG/1
4 TABLET, FILM COATED ORAL EVERY 8 HOURS PRN
Qty: 10 TABLET | Refills: 0 | Status: SHIPPED | OUTPATIENT
Start: 2022-10-25 | End: 2022-10-30

## 2022-10-25 RX ORDER — PROMETHAZINE HYDROCHLORIDE 25 MG/ML
6.25 INJECTION, SOLUTION INTRAMUSCULAR; INTRAVENOUS EVERY 5 MIN PRN
Status: DISCONTINUED | OUTPATIENT
Start: 2022-10-25 | End: 2022-10-25 | Stop reason: HOSPADM

## 2022-10-25 RX ORDER — SODIUM CHLORIDE, SODIUM LACTATE, POTASSIUM CHLORIDE, CALCIUM CHLORIDE 600; 310; 30; 20 MG/100ML; MG/100ML; MG/100ML; MG/100ML
INJECTION, SOLUTION INTRAVENOUS CONTINUOUS
Status: DISCONTINUED | OUTPATIENT
Start: 2022-10-25 | End: 2022-10-25 | Stop reason: HOSPADM

## 2022-10-25 RX ORDER — DEXAMETHASONE SODIUM PHOSPHATE 10 MG/ML
INJECTION, SOLUTION INTRAMUSCULAR; INTRAVENOUS PRN
Status: DISCONTINUED | OUTPATIENT
Start: 2022-10-25 | End: 2022-10-25 | Stop reason: SDUPTHER

## 2022-10-25 RX ORDER — CEFAZOLIN 2 G/1
INJECTION, POWDER, FOR SOLUTION INTRAMUSCULAR; INTRAVENOUS
Status: DISCONTINUED
Start: 2022-10-25 | End: 2022-10-25 | Stop reason: HOSPADM

## 2022-10-25 RX ORDER — SODIUM CHLORIDE 0.9 % (FLUSH) 0.9 %
5-40 SYRINGE (ML) INJECTION EVERY 12 HOURS SCHEDULED
Status: DISCONTINUED | OUTPATIENT
Start: 2022-10-25 | End: 2022-10-25 | Stop reason: HOSPADM

## 2022-10-25 RX ORDER — MIDAZOLAM HYDROCHLORIDE 1 MG/ML
INJECTION INTRAMUSCULAR; INTRAVENOUS PRN
Status: DISCONTINUED | OUTPATIENT
Start: 2022-10-25 | End: 2022-10-25 | Stop reason: SDUPTHER

## 2022-10-25 RX ORDER — SODIUM CHLORIDE 9 MG/ML
INJECTION, SOLUTION INTRAVENOUS PRN
Status: DISCONTINUED | OUTPATIENT
Start: 2022-10-25 | End: 2022-10-25 | Stop reason: HOSPADM

## 2022-10-25 RX ORDER — HYDROCODONE BITARTRATE AND ACETAMINOPHEN 5; 325 MG/1; MG/1
1 TABLET ORAL EVERY 6 HOURS PRN
Qty: 15 TABLET | Refills: 0 | Status: SHIPPED | OUTPATIENT
Start: 2022-10-25 | End: 2022-11-01

## 2022-10-25 RX ORDER — SODIUM CHLORIDE 9 MG/ML
INJECTION, SOLUTION INTRAVENOUS CONTINUOUS PRN
Status: DISCONTINUED | OUTPATIENT
Start: 2022-10-25 | End: 2022-10-25 | Stop reason: SDUPTHER

## 2022-10-25 RX ORDER — INDOCYANINE GREEN AND WATER 25 MG
5 KIT INJECTION ONCE
Status: COMPLETED | OUTPATIENT
Start: 2022-10-25 | End: 2022-10-25

## 2022-10-25 RX ORDER — GLYCOPYRROLATE 0.2 MG/ML
INJECTION INTRAMUSCULAR; INTRAVENOUS PRN
Status: DISCONTINUED | OUTPATIENT
Start: 2022-10-25 | End: 2022-10-25 | Stop reason: SDUPTHER

## 2022-10-25 RX ORDER — ONDANSETRON 2 MG/ML
INJECTION INTRAMUSCULAR; INTRAVENOUS
Status: DISCONTINUED
Start: 2022-10-25 | End: 2022-10-25 | Stop reason: HOSPADM

## 2022-10-25 RX ORDER — ONDANSETRON 2 MG/ML
4 INJECTION INTRAMUSCULAR; INTRAVENOUS
Status: COMPLETED | OUTPATIENT
Start: 2022-10-25 | End: 2022-10-25

## 2022-10-25 RX ORDER — HYDROCODONE BITARTRATE AND ACETAMINOPHEN 5; 325 MG/1; MG/1
1 TABLET ORAL ONCE
Status: COMPLETED | OUTPATIENT
Start: 2022-10-25 | End: 2022-10-25

## 2022-10-25 RX ORDER — SODIUM CHLORIDE 9 MG/ML
25 INJECTION, SOLUTION INTRAVENOUS PRN
Status: DISCONTINUED | OUTPATIENT
Start: 2022-10-25 | End: 2022-10-25 | Stop reason: HOSPADM

## 2022-10-25 RX ORDER — DOCUSATE SODIUM 100 MG/1
100 CAPSULE, LIQUID FILLED ORAL 2 TIMES DAILY
Qty: 20 CAPSULE | Refills: 0 | Status: SHIPPED | OUTPATIENT
Start: 2022-10-25

## 2022-10-25 RX ORDER — MIDAZOLAM HYDROCHLORIDE 2 MG/2ML
2 INJECTION, SOLUTION INTRAMUSCULAR; INTRAVENOUS
Status: DISCONTINUED | OUTPATIENT
Start: 2022-10-25 | End: 2022-10-25 | Stop reason: HOSPADM

## 2022-10-25 RX ORDER — ONDANSETRON 2 MG/ML
INJECTION INTRAMUSCULAR; INTRAVENOUS PRN
Status: DISCONTINUED | OUTPATIENT
Start: 2022-10-25 | End: 2022-10-25 | Stop reason: SDUPTHER

## 2022-10-25 RX ADMIN — HYDROCODONE BITARTRATE AND ACETAMINOPHEN 1 TABLET: 5; 325 TABLET ORAL at 15:04

## 2022-10-25 RX ADMIN — Medication 3 MG: at 14:18

## 2022-10-25 RX ADMIN — SODIUM CHLORIDE: 9 INJECTION, SOLUTION INTRAVENOUS at 14:10

## 2022-10-25 RX ADMIN — ONDANSETRON 4 MG: 2 INJECTION INTRAMUSCULAR; INTRAVENOUS at 15:04

## 2022-10-25 RX ADMIN — KETOROLAC TROMETHAMINE 30 MG: 30 INJECTION, SOLUTION INTRAMUSCULAR at 14:09

## 2022-10-25 RX ADMIN — MIDAZOLAM 2 MG: 1 INJECTION INTRAMUSCULAR; INTRAVENOUS at 13:22

## 2022-10-25 RX ADMIN — FENTANYL CITRATE 100 MCG: 50 INJECTION, SOLUTION INTRAMUSCULAR; INTRAVENOUS at 13:26

## 2022-10-25 RX ADMIN — INDOCYANINE GREEN AND WATER 5 MG: KIT at 12:47

## 2022-10-25 RX ADMIN — LIDOCAINE HYDROCHLORIDE 40 MG: 10 INJECTION, SOLUTION INFILTRATION; PERINEURAL at 13:26

## 2022-10-25 RX ADMIN — ONDANSETRON 4 MG: 2 INJECTION INTRAMUSCULAR; INTRAVENOUS at 14:00

## 2022-10-25 RX ADMIN — SODIUM CHLORIDE, POTASSIUM CHLORIDE, SODIUM LACTATE AND CALCIUM CHLORIDE: 600; 310; 30; 20 INJECTION, SOLUTION INTRAVENOUS at 12:05

## 2022-10-25 RX ADMIN — DEXAMETHASONE SODIUM PHOSPHATE 8 MG: 10 INJECTION, SOLUTION INTRAMUSCULAR; INTRAVENOUS at 13:41

## 2022-10-25 RX ADMIN — FENTANYL CITRATE 50 MCG: 50 INJECTION, SOLUTION INTRAMUSCULAR; INTRAVENOUS at 14:23

## 2022-10-25 RX ADMIN — FENTANYL CITRATE 50 MCG: 50 INJECTION, SOLUTION INTRAMUSCULAR; INTRAVENOUS at 13:46

## 2022-10-25 RX ADMIN — PROPOFOL 200 MG: 10 INJECTION, EMULSION INTRAVENOUS at 13:26

## 2022-10-25 RX ADMIN — GLYCOPYRROLATE 0.6 MG: 0.2 INJECTION INTRAMUSCULAR; INTRAVENOUS at 14:17

## 2022-10-25 RX ADMIN — DIPHENHYDRAMINE HYDROCHLORIDE 10 MG: 50 INJECTION, SOLUTION INTRAMUSCULAR; INTRAVENOUS at 13:41

## 2022-10-25 RX ADMIN — ROCURONIUM BROMIDE 40 MG: 10 INJECTION, SOLUTION INTRAVENOUS at 13:26

## 2022-10-25 RX ADMIN — CEFAZOLIN 2000 MG: 2 INJECTION, POWDER, FOR SOLUTION INTRAMUSCULAR; INTRAVENOUS at 13:35

## 2022-10-25 ASSESSMENT — PAIN - FUNCTIONAL ASSESSMENT: PAIN_FUNCTIONAL_ASSESSMENT: 0-10

## 2022-10-25 ASSESSMENT — LIFESTYLE VARIABLES: SMOKING_STATUS: 1

## 2022-10-25 ASSESSMENT — PAIN DESCRIPTION - PAIN TYPE: TYPE: SURGICAL PAIN

## 2022-10-25 ASSESSMENT — PAIN SCALES - GENERAL: PAINLEVEL_OUTOF10: 3

## 2022-10-25 ASSESSMENT — PAIN DESCRIPTION - DESCRIPTORS: DESCRIPTORS: ACHING

## 2022-10-25 ASSESSMENT — PAIN DESCRIPTION - LOCATION: LOCATION: ABDOMEN

## 2022-10-25 NOTE — ANESTHESIA PRE PROCEDURE
Department of Anesthesiology  Preprocedure Note       Name:  Gavin Carrion   Age:  23 y.o.  :  2000                                          MRN:  [id number]         Date:  10/25/2022      Surgeon: Ever Mcdonald):  Fe Wen DO    Procedure: Procedure(s):  CHOLECYSTECTOMY LAPAROSCOPIC ROBOTIC WITH FIRELFY    Medications prior to admission:   Prior to Admission medications    Medication Sig Start Date End Date Taking?  Authorizing Provider   naproxen (NAPROSYN) 500 MG tablet Take 1 tablet by mouth 2 times daily (with meals) 22   Armando Ho, APRN - CNP   norethindrone-ethinyl estradiol (EDI FE ) 1-20 MG-MCG per tablet take 1 tablet by mouth once daily 21   Jimmy Gasca MD       Current medications:    Current Facility-Administered Medications   Medication Dose Route Frequency Provider Last Rate Last Admin    lidocaine 1 % injection 1 mL  1 mL IntraDERmal Once PRN Eliazar Diaz MD        lactated ringers infusion   IntraVENous Continuous Larna Gave,  mL/hr at 10/25/22 1205 New Bag at 10/25/22 1205    sodium chloride flush 0.9 % injection 5-40 mL  5-40 mL IntraVENous 2 times per day Eliazar Diaz MD        sodium chloride flush 0.9 % injection 5-40 mL  5-40 mL IntraVENous PRN Eliazar Diaz MD        0.9 % sodium chloride infusion   IntraVENous PRN Eduinna MD Emily        ceFAZolin (ANCEF) 2,000 mg in sodium chloride 0.9 % 50 mL IVPB (mini-bag)  2,000 mg IntraVENous On Call to OR Fe Wen DO        indocyanine green (IC-GREEN) syringe 5 mg  5 mg IntraVENous Once Fe Wen DO        ceFAZolin (ANCEF) 2 g injection             indocyanine green (IC-GREEN) 25 MG syringe                Allergies:  No Known Allergies    Problem List:    Patient Active Problem List   Diagnosis Code    Surveillance for birth control, oral contraceptives Z30.41    Influenza vaccine refused Z28.21    Epidermoid cyst of labia majora C30.9    Helicobacter pylori gastritis K29.70, B96.81       Past Medical History:  History reviewed. No pertinent past medical history. Past Surgical History:  History reviewed. No pertinent surgical history. Social History:    Social History     Tobacco Use    Smoking status: Never    Smokeless tobacco: Never   Substance Use Topics    Alcohol use: Yes     Comment: social                                Counseling given: Not Answered      Vital Signs (Current):   Vitals:    10/19/22 1129 10/25/22 1151   BP:  125/89   Pulse:  86   Resp:  14   Temp:  97.2 °F (36.2 °C)   TempSrc:  Infrared   SpO2:  97%   Weight: 180 lb (81.6 kg) 180 lb (81.6 kg)   Height: 5' 5\" (1.651 m) 5' 4\" (1.626 m)                                              BP Readings from Last 3 Encounters:   10/25/22 125/89   10/18/22 125/89   10/04/22 118/70       NPO Status: Time of last liquid consumption: 2000                        Time of last solid consumption: 2000                        Date of last liquid consumption: 10/24/22                        Date of last solid food consumption: 10/24/22    BMI:   Wt Readings from Last 3 Encounters:   10/25/22 180 lb (81.6 kg)   10/18/22 182 lb (82.6 kg)   10/04/22 182 lb (82.6 kg)     Body mass index is 30.9 kg/m².     CBC:   Lab Results   Component Value Date/Time    WBC 6.1 09/30/2022 01:23 PM    RBC 3.97 09/30/2022 01:23 PM    HGB 12.5 09/30/2022 01:23 PM    HCT 36.5 09/30/2022 01:23 PM    MCV 91.7 09/30/2022 01:23 PM    RDW 12.4 09/30/2022 01:23 PM     09/30/2022 01:23 PM       CMP:   Lab Results   Component Value Date/Time     09/30/2022 01:23 PM    K 4.0 09/30/2022 01:23 PM     09/30/2022 01:23 PM    CO2 26 09/30/2022 01:23 PM    BUN 11 09/30/2022 01:23 PM    CREATININE 0.60 09/30/2022 01:23 PM    GFRAA >60 09/30/2022 01:23 PM    LABGLOM >60 09/30/2022 01:23 PM    GLUCOSE 78 09/30/2022 01:23 PM    PROT 7.7 09/30/2022 01:23 PM    CALCIUM 9.6 09/30/2022 01:23 PM    BILITOT 0.3 09/30/2022 01:23 PM    ALKPHOS 46 09/30/2022 01:23 PM    AST 18 09/30/2022 01:23 PM    ALT 16 09/30/2022 01:23 PM       POC Tests: No results for input(s): POCGLU, POCNA, POCK, POCCL, POCBUN, POCHEMO, POCHCT in the last 72 hours. Coags: No results found for: PROTIME, INR, APTT    HCG (If Applicable):   Lab Results   Component Value Date    PREGTESTUR NEGATIVE 09/30/2022    HCG NEGATIVE 10/25/2022        ABGs: No results found for: PHART, PO2ART, KLQ7KHL, OWA3ALC, BEART, Y8JCVHYI     Type & Screen (If Applicable):  No results found for: LABABO, LABRH    Drug/Infectious Status (If Applicable):  No results found for: HIV, HEPCAB    COVID-19 Screening (If Applicable): No results found for: COVID19        Anesthesia Evaluation  Patient summary reviewed and Nursing notes reviewed  Airway: Mallampati: I  TM distance: >3 FB   Neck ROM: full  Mouth opening: > = 3 FB   Dental: normal exam         Pulmonary:normal exam    (+) current smoker                          ROS comment: -VAPES FOR PAST 1 YEAR   Cardiovascular:Negative CV ROS                      Neuro/Psych:   Negative Neuro/Psych ROS              GI/Hepatic/Renal: Neg GI/Hepatic/Renal ROS            Endo/Other: Negative Endo/Other ROS                     ROS comment: -NPO AFTER MIDNIGHT  -NKDA Abdominal:             Vascular: negative vascular ROS. Other Findings:           Anesthesia Plan      general     ASA 1     (GETA)  Induction: intravenous. MIPS: Postoperative opioids intended and Prophylactic antiemetics administered. Anesthetic plan and risks discussed with patient. Plan discussed with CRNA.     Attending anesthesiologist reviewed and agrees with Derrek Bauer MD   10/25/2022

## 2022-10-25 NOTE — ANESTHESIA POSTPROCEDURE EVALUATION
Department of Anesthesiology  Postprocedure Note    Patient: Dayo Nguyen  MRN: [id number]  [place]: 2000  Date of evaluation: 10/25/2022      Procedure Summary     Date: 10/25/22 Room / Location: [address]    Anesthesia Start: 4538 Anesthesia Stop: 0578    Procedure: CHOLECYSTECTOMY LAPAROSCOPIC ROBOTIC WITH FIRELFY Diagnosis:       Symptomatic cholelithiasis      (Symptomatic cholelithiasis [K80.20])    Surgeons: Liu Wood DO Responsible Provider: Jordan Dash MD    Anesthesia Type: general ASA Status: 1          Anesthesia Type: No value filed.     Veena Phase I: Veena Score: 7    Veena Phase II:        Anesthesia Post Evaluation    Patient location during evaluation: PACU  Patient participation: complete - patient participated  Level of consciousness: awake and alert  Airway patency: patent  Nausea & Vomiting: no nausea and no vomiting  Complications: no  Cardiovascular status: hemodynamically stable  Respiratory status: room air and spontaneous ventilation  Hydration status: euvolemic  Multimodal analgesia pain management approach

## 2022-10-25 NOTE — DISCHARGE INSTRUCTIONS
Patient Discharge Instructions  Discharge Date:  10/25/2022    HYGEINE: Ryland Erwin to shower 10/26/22, no soaking in a tub/pool until seen at your follow up appointment. Clean incision daily with gentle soap and water, do not use alcohol/peroxide or any harsh cleansers. DRIVING: No driving while taking narcotic medications or while in pain    ACTIVITY: No lifting greater than 20lbs for 6 weeks or any strenuous activity. DIET: No fatty foods for one week then resume normal diet after. MEDICATIONS: Take all medications as prescribed. Take Colace until you have your first bowel movement, continue to take if you are using narcotics for pain control    SPECIAL INSTRUCTIONS:     Follow up with Dr. Estela Lafleur in 10-14 days, call the clinic for appointment. Call sooner if fever above 100.4 degrees Farenheit, increase in swelling or redness, thick purulent discharge or pain not controlled with medications. Activity   You have had anesthesia today  Do not drive, operate heavy equipment, consume alcoholic beverages, or make any important decisions  for 24 hours   If you are taking pain medication: Do not drive or consume alcohol. Take your time changing positions today. You may feel light headed or dizzy if you move too quickly. Continue your home medications as ordered by your physician. Diet   You can eat your normal diet when you feel well. You should start off with bland foods like chicken soup, toast, or yogurt. Then advance as tolerated. Drink plenty of fluids (unless your doctor tells you not to). Your urine should be very lightly colored without a strong odor.

## 2022-10-25 NOTE — H&P
[address]      Patient's Name/ Date of Birth/ Gender: Malgorzata Hernandez / 2000 (23 y.o.) / female     Attending physician: Juliet Mckenna DO    CC: symptomatic cholelithiasis    History of present Illness: Malgorzata Hernandez is a 23 y.o. female, presents for cholecystectomy. Past Medical History:  has no past medical history on file. Past Surgical History: History reviewed. No pertinent surgical history. Social History:  reports that she has never smoked. She has never used smokeless tobacco. She reports current alcohol use. She reports that she does not use drugs. Family History: family history includes Diabetes in an other family member; High Blood Pressure in an other family member. Review of Systems:   General: Denies fever, chills, night sweats, weight loss, malaise, fatigue  HEENT: Denies sore throat, sinus problems, allergic rhinosinusitis  Card: Denies chest pain, palpitations, orthopnea/PND. Denies h/o murmurs  Pulm: Denies cough, shortness of breath, ARM[place]  GI:   Denies history of constipation, diarrhea, hematochezia or melena  : Denies polyuria, dysuria, hematuria  Endo: Denies diabetes, thyroid problems. Heme: Denies anemia, h/o bleeding or clotting problems. Neuro: Denies h/o CVA, TIA  Skin: Denies rashes, ulcers  Musculoskeletal: Denies muscle, joint, back pain. Allergies: Patient has no known allergies.     Current Meds:  Current Facility-Administered Medications:     lidocaine 1 % injection 1 mL, 1 mL, IntraDERmal, Once PRN, Edel Baron MD    lactated ringers infusion, , IntraVENous, Continuous, Edel Baron MD, Last Rate: 100 mL/hr at 10/25/22 1205, New Bag at 10/25/22 1205    sodium chloride flush 0.9 % injection 5-40 mL, 5-40 mL, IntraVENous, 2 times per day, Edel Baron MD    sodium chloride flush 0.9 % injection 5-40 mL, 5-40 mL, IntraVENous, PRN, Edel Baron MD    0.9 % sodium chloride infusion, ,

## 2022-10-25 NOTE — OP NOTE
Operative Note      Patient: Brenda Bhagat  YOB: 2000  MRN: [id number]    Date of Procedure: 10/25/2022    Pre-Op Diagnosis: Symptomatic cholelithiasis [K80.20]    Post-Op Diagnosis:   Same, chronic cholecystitis       Procedure(s):  CHOLECYSTECTOMY LAPAROSCOPIC ROBOTIC WITH FIRELFY    Surgeon(s):  Laurent Tolentino DO    Assistant:   * No surgical staff found *    Anesthesia: General    Estimated Blood Loss (mL): Minimal    Complications: None    Specimens:   ID Type Source Tests Collected by Time Destination   A : GALLBLADDER AND CONTENTS Tissue Gallbladder SURGICAL PATHOLOGY Laurent Tolentino DO 10/25/2022 1401        Implants:  * No implants in log *      Drains: * No LDAs found *    Findings: as above. Wound class 2    Detailed Description of Procedure:         HISTORY: The patient is a 23y.o. year old female with history of above preop diagnosis. The risk, benefits, expected outcome, and alternatives to the procedure were explained to the patient's understanding and written informed consent was obtained. DESCRIPTION OF PROCEDURE:  Patient was brought to the operating suite and placed on the operating table in supine position. Timeout was performed verifying correct patient, position, equipment and procedure to be performed. EPC cuffs were applied and preoperative antibiotics were infused. General anesthesia administered and the patient was endotracheally intubated. The patient's abdomen was prepped and draped in the usual sterile fashion. Scalpel was used to make a transverse incision 1cm at Frost's point, 8mm Optiview trocar was used to gain entry into the abdomen under direct visualization, no injury to the underlying structures were seen. 1cm transverse incisions were then made below  the umbilicus and two additional incisions in the RLQ at the midclavicular and anterior axillary lines. 8mm robot ports were placed under direct visualization at these sites.  Pneumoperitoneum established with 15mmHg CO2. The patient was then positioned in reverse Trendelenburg with rotation the patient's left, then the robot was docked in the usual fashion. The gallbladder appeared distended. Atraumatic graspers were used to grasp and elevate the fundus and infundibulum. Adhesions between the gallbladder and omentum were dissected away using blunt dissection as well as with electrocautery. The peritoneum between the gallbladder and liver were taken down with electrocautery on the medial and lateral aspects. The cystic duct and artery were skeletonized until the critical view was achieved. The cystic duct was clipped proximally and distally with Hemalok clips. The cystic artery was clipped proximally with Hemalok clips. The duct and artery were then transected with scissors . The gallbladder was dissected from the gallbladder fossa with electrocautery, then placed into an laparoscopic specimen bag which was removed from the abdomen through the LUQ incision. On inspection of the area of dissection, hemostasis was excellent, no leakage of bile was noted, clips were intact. ICG had been administered preoperatively, firefly was used at multiple points to identify both cystic duct as well as common bile duct to carry out a safe dissection, dissection was performed away from the CBD at all times. The working ports were removed under direct visualization, the camera and camera port were then removed and the abdomen dessuflated. The port sites were locally anesthetized with a total of 40cc of 1% lidocaine and 0.25% marcaine without epinephrine. The port sites were then closed with subcuticular sutures of 4-0 monocryl then covered with skin glue. The patient tolerated the procedure well without complications, all sponge needle and instrument counts were correct at the end of the case. The patient was successfully extubated and taken to PACU in stable condition.         Electronically signed by Erica Tripp DO on 10/25/2022 at 2:35 PM

## 2022-10-27 LAB — SURGICAL PATHOLOGY REPORT: NORMAL

## 2022-11-02 DIAGNOSIS — Z30.41 SURVEILLANCE FOR BIRTH CONTROL, ORAL CONTRACEPTIVES: ICD-10-CM

## 2022-11-02 RX ORDER — NORETHINDRONE ACETATE AND ETHINYL ESTRADIOL 1MG-20(21)
KIT ORAL
Qty: 28 TABLET | Refills: 11 | Status: SHIPPED | OUTPATIENT
Start: 2022-11-02

## 2022-11-04 ENCOUNTER — OFFICE VISIT (OUTPATIENT)
Dept: SURGERY | Age: 22
End: 2022-11-04

## 2022-11-04 VITALS
HEIGHT: 64 IN | HEART RATE: 98 BPM | SYSTOLIC BLOOD PRESSURE: 115 MMHG | WEIGHT: 180 LBS | RESPIRATION RATE: 16 BRPM | DIASTOLIC BLOOD PRESSURE: 82 MMHG | BODY MASS INDEX: 30.73 KG/M2

## 2022-11-04 DIAGNOSIS — Z90.49 STATUS POST LAPAROSCOPIC CHOLECYSTECTOMY: Primary | ICD-10-CM

## 2022-11-04 PROCEDURE — 99024 POSTOP FOLLOW-UP VISIT: CPT | Performed by: SURGERY

## 2022-11-04 NOTE — LETTER
[facility]  [address] [place] [facility] [address]  Phone: [phone]  Fax: [phone]    Candida Lindsey DO        November 4, 2022     Patient: Minesh Robles   YOB: 2000   Date of Visit: 11/4/2022       To Whom It May Concern: It is my medical opinion that Isamar Brownlee may return to work on 11/07/22 with the following restrictions: lifting/carrying not to exceed 20 lbs. , pushing/pulling not to exceed 20 lbs. for the next 4 wks 12/05/22. If you have any questions or concerns, please don't hesitate to call.     Sincerely,        Candida Lindsey DO

## 2022-11-04 NOTE — LETTER
[facility]  Surgical Specialties - General Surgery  [address]  [facility] [place] [address]  Phone: [phone]  Fax: [phone]      22    Patient: Brenda Bhagat  MRN: [id number]  : 2000  Date of visit: 2022    Dear Viky Sutherland MD:      I saw Brenda Bhagat in follow up to robot cholecystectomy, she is doing well. Below are the relevant portions of my assessment and plan of care. If you have questions, please do not hesitate to call me. I look forward to following Brenda Bhagat along with you.     Sincerely,        Laurent Tolentino DO

## 2022-11-05 NOTE — PROGRESS NOTES
[id number] [facility] Surgery   History & Physical  Alejandra Toscano DO    PATIENT NAME: Missael Quintana VISIT DATE: 11/4/2022    SUBJECTIVE:  Arnol Fitzgerald is a 23 y.o. female who presents to the clinic today for follow up to robot ra performed 10-25-22. No new issues since surgery, pt is tolerating regular diet and having normal BM. Pathology as follows:    -- Diagnosis --   Gallbladder, laparoscopic cholecystectomy:   Chronic cholecystitis. Cholesterolosis. Cholelithiasis. OBJECTIVE:    /82 (Site: Left Upper Arm, Position: Sitting, Cuff Size: Medium Adult)   Pulse 98   Resp 16   Ht 5' 4\" (1.626 m)   Wt 180 lb (81.6 kg)   BMI 30.90 kg/m²     General Appearance:  awake, alert, no acute distress, well developed, well nourished   Skin:  Skin color, texture, turgor normal. No rashes or lesions. Lungs:  Normal chest expansion, unlabored breathing without accessory muscle use. No audible rales, rhonchi, or wheezing. Cardiovascular: S1S2. No evidence of JVD. No evidence of pulsatile masses in abdomen  Abdomen:  Soft, non-tender, no organomegaly, no masses. Incisions C/D/I without evidence of bleeding/infection  Musculoskeletal: No evidence of bony/muscular deformities, trauma, atrophy of either left/right upper/lower extremity. No evidence of digital clubbing or cyanosis. ASSESSMENT:  1. Status post laparoscopic cholecystectomy          PLAN:  Lifting restriction to less than 20lbs for the next 4 weeks, unrestricted after this.   F/U prn    Electronically signed by Alejandra Toscano DO on 11/5/2022 at 10:49 AM

## 2023-03-28 DIAGNOSIS — B96.81 HELICOBACTER PYLORI GASTRITIS: ICD-10-CM

## 2023-03-28 DIAGNOSIS — K29.70 HELICOBACTER PYLORI GASTRITIS: ICD-10-CM

## 2023-03-28 RX ORDER — OMEPRAZOLE 40 MG/1
CAPSULE, DELAYED RELEASE ORAL
Qty: 90 CAPSULE | Refills: 1 | OUTPATIENT
Start: 2023-03-28

## 2023-07-06 ENCOUNTER — HOSPITAL ENCOUNTER (EMERGENCY)
Age: 23
Discharge: HOME | End: 2023-07-06
Attending: EMERGENCY MEDICINE
Payer: COMMERCIAL

## 2023-07-06 VITALS
BODY MASS INDEX: 31.58 KG/M2 | TEMPERATURE: 98.4 F | OXYGEN SATURATION: 100 % | HEIGHT: 64 IN | DIASTOLIC BLOOD PRESSURE: 84 MMHG | HEART RATE: 95 BPM | WEIGHT: 185 LBS | RESPIRATION RATE: 18 BRPM | SYSTOLIC BLOOD PRESSURE: 122 MMHG

## 2023-07-06 DIAGNOSIS — K62.5 RECTAL BLEEDING: Primary | ICD-10-CM

## 2023-07-06 LAB
DATE, STOOL #1: ABNORMAL
HEMOCCULT SP1 STL QL: POSITIVE
TIME, STOOL #1: 1100

## 2023-07-06 PROCEDURE — 99283 EMERGENCY DEPT VISIT LOW MDM: CPT

## 2023-07-06 PROCEDURE — 82270 OCCULT BLOOD FECES: CPT

## 2023-07-06 ASSESSMENT — PAIN - FUNCTIONAL ASSESSMENT: PAIN_FUNCTIONAL_ASSESSMENT: NONE - DENIES PAIN

## 2023-07-06 NOTE — ED TRIAGE NOTES
Mode of arrival (squad #, walk in, police, etc) : walk-in        Chief complaint(s): rectal bleeding        Arrival Note (brief scenario, treatment PTA, etc). : Pt reports rectal bleeding x1 day. Pt reports she has had this in the past but, this time there is a lot more bright red blood. C= \"Have you ever felt that you should Cut down on your drinking? \"  No  A= \"Have people Annoyed you by criticizing your drinking? \"  No  G= \"Have you ever felt bad or Guilty about your drinking? \"  No  E= \"Have you ever had a drink as an Eye-opener first thing in the morning to steady your nerves or to help a hangover? \"  No      Deferred []      Reason for deferring: N/A    *If yes to two or more: probable alcohol abuse. *

## 2023-07-06 NOTE — ED PROVIDER NOTES
EMERGENCY DEPARTMENT ENCOUNTER    Pt Name: Jose Stinson  MRN: [id number]  [address] [place] 2000  Date of evaluation: 7/6/23  CHIEF COMPLAINT       Chief Complaint   Patient presents with    Rectal Bleeding     HISTORY OF PRESENT ILLNESS   HPI  Patient presents with complaint of rectal bleeding x 1 day. She also had a similar episode a few weeks ago, but it resolved on its own. She had her gallbladder removed in Oct 2022. Since then stools have not been normal. Have been very soft, almost diarrhea at times. No constipation at all. Has been noticing some mucous in her stools. Yesterday had a soft BM with some bright red blood streaks on it. This morning had another soft BM with even more bright red blood - seemed more mixed in than on the surface. She hasn't noted any fissures or hemorrhoids. Denies any abdominal pain, anorexia, nausea, vomiting, weight loss. No fevers or chills. No abd cramping. No syncope or pre-syncope. Just finished her period, denies chance of pregnancy. No known family history of ulcerative colitis or crohn's disease. She believes there is a family history of colon cancer on her father's side, but unsure of details as far as who and age. She is otherwise healthy, no other complaints today. Only med she takes is daily OCP. PASTMEDICAL HISTORY   No past medical history on file.   Patient Active Problem List   Diagnosis Code    Surveillance for birth control, oral contraceptives Z30.41    Influenza vaccine refused Z28.21    Epidermoid cyst of labia majora M38.5    Helicobacter pylori gastritis K29.70, B96.81     SURGICAL HISTORY       Past Surgical History:   Procedure Laterality Date    CHOLECYSTECTOMY, LAPAROSCOPIC  10/25/2022    CHOLECYSTECTOMY LAPAROSCOPIC ROBOTIC WITH FIRELFY    CHOLECYSTECTOMY, LAPAROSCOPIC N/A 10/25/2022    CHOLECYSTECTOMY LAPAROSCOPIC ROBOTIC WITH FIRELFY performed by Farrah Parker DO at [address]       No current facility-administered

## 2023-07-06 NOTE — ED PROVIDER NOTES
eMERGENCY dEPARTMENT eNCOUnter   Independent Attestation     Pt Name: Ally Marcos  MRN: [id number]  [address] [facility] 2000  Date of evaluation: 7/6/23     Ally Marcos is a 26 y.o. female with CC: Rectal Bleeding      Based on the medical record the care appears appropriate. I was personally available for consultation in the Emergency Department.     Miya Yuen DO  Attending Emergency Physician                 Miya Yuen DO  07/06/23 [id number]

## 2023-10-04 DIAGNOSIS — Z30.41 SURVEILLANCE FOR BIRTH CONTROL, ORAL CONTRACEPTIVES: ICD-10-CM

## 2023-10-04 RX ORDER — NORETHINDRONE ACETATE AND ETHINYL ESTRADIOL 1MG-20(21)
KIT ORAL
Qty: 28 TABLET | Refills: 11 | OUTPATIENT
Start: 2023-10-04

## 2023-10-05 ENCOUNTER — OFFICE VISIT (OUTPATIENT)
Dept: FAMILY MEDICINE CLINIC | Age: 23
End: 2023-10-05
Payer: COMMERCIAL

## 2023-10-05 VITALS
BODY MASS INDEX: 32.95 KG/M2 | WEIGHT: 193 LBS | HEIGHT: 64 IN | SYSTOLIC BLOOD PRESSURE: 126 MMHG | HEART RATE: 60 BPM | DIASTOLIC BLOOD PRESSURE: 70 MMHG | OXYGEN SATURATION: 98 %

## 2023-10-05 DIAGNOSIS — Z11.8 SCREENING FOR CHLAMYDIAL DISEASE: ICD-10-CM

## 2023-10-05 DIAGNOSIS — Z30.41 SURVEILLANCE FOR BIRTH CONTROL, ORAL CONTRACEPTIVES: Primary | ICD-10-CM

## 2023-10-05 DIAGNOSIS — Z11.59 ENCOUNTER FOR SCREENING FOR OTHER VIRAL DISEASES: ICD-10-CM

## 2023-10-05 PROCEDURE — 99213 OFFICE O/P EST LOW 20 MIN: CPT | Performed by: FAMILY MEDICINE

## 2023-10-05 RX ORDER — NORETHINDRONE ACETATE AND ETHINYL ESTRADIOL 1MG-20(21)
1 KIT ORAL DAILY
Qty: 28 TABLET | Refills: 11 | Status: SHIPPED | OUTPATIENT
Start: 2023-10-05

## 2023-10-05 SDOH — ECONOMIC STABILITY: FOOD INSECURITY: WITHIN THE PAST 12 MONTHS, YOU WORRIED THAT YOUR FOOD WOULD RUN OUT BEFORE YOU GOT MONEY TO BUY MORE.: NEVER TRUE

## 2023-10-05 SDOH — ECONOMIC STABILITY: HOUSING INSECURITY
IN THE LAST 12 MONTHS, WAS THERE A TIME WHEN YOU DID NOT HAVE A STEADY PLACE TO SLEEP OR SLEPT IN A SHELTER (INCLUDING NOW)?: NO

## 2023-10-05 SDOH — ECONOMIC STABILITY: FOOD INSECURITY: WITHIN THE PAST 12 MONTHS, THE FOOD YOU BOUGHT JUST DIDN'T LAST AND YOU DIDN'T HAVE MONEY TO GET MORE.: NEVER TRUE

## 2023-10-05 SDOH — ECONOMIC STABILITY: INCOME INSECURITY: HOW HARD IS IT FOR YOU TO PAY FOR THE VERY BASICS LIKE FOOD, HOUSING, MEDICAL CARE, AND HEATING?: NOT HARD AT ALL

## 2023-10-05 ASSESSMENT — ENCOUNTER SYMPTOMS
BACK PAIN: 0
CONSTIPATION: 0
EYE REDNESS: 0
DIARRHEA: 0
BLOOD IN STOOL: 0
COUGH: 0
SHORTNESS OF BREATH: 0
ABDOMINAL PAIN: 0

## 2023-10-05 ASSESSMENT — PATIENT HEALTH QUESTIONNAIRE - PHQ9
SUM OF ALL RESPONSES TO PHQ9 QUESTIONS 1 & 2: 0
SUM OF ALL RESPONSES TO PHQ QUESTIONS 1-9: 0
1. LITTLE INTEREST OR PLEASURE IN DOING THINGS: 0
SUM OF ALL RESPONSES TO PHQ QUESTIONS 1-9: 0
2. FEELING DOWN, DEPRESSED OR HOPELESS: 0

## 2023-10-05 NOTE — PATIENT INSTRUCTIONS
Thank you for choosing [address] [place] [address] as your healthcare provider as your care is important to us. Please arrive at your appointment on time. If you are unable to make your appointment, please call our office as soon as possible so that we may reschedule your appointment. Missing 3 appointments in a calendar year without notifying the office may lead to dismissal from the practice. We appreciate you calling at least 24 hours in advance, when possible. Thank you. New Updates for [facility]    Thank you for choosing US to give you the best care! [address] [place] [address] is always trying to think of new ways to help their patients. We are asking all patients to try out the new digital registration that is now available through your [facility] account Via the martín you're now able to update your personal and registration information prior to your upcoming appointment. This will save you time once you arrive at the office to check-in, not to mention your information remains safe!! Many other perks come from signing up for an account, such as:  Requesting refills  Scheduling an appointment  Completing an E-Visit  Sending a message to the office/provider  Having access to your medication list  Paying your bill/copay prior to your appointment  Scheduling your yearly mammogram  Review your test results    If you are not familiar with [facility] please ask one of us and we will be happy to answer any questions or help you set-up your account.       Your [employer],

## 2024-04-03 ENCOUNTER — OFFICE VISIT (OUTPATIENT)
Dept: GASTROENTEROLOGY | Age: 24
End: 2024-04-03
Payer: COMMERCIAL

## 2024-04-03 ENCOUNTER — TELEPHONE (OUTPATIENT)
Dept: GASTROENTEROLOGY | Age: 24
End: 2024-04-03

## 2024-04-03 VITALS — WEIGHT: 190.2 LBS | OXYGEN SATURATION: 98 % | BODY MASS INDEX: 32.65 KG/M2 | HEART RATE: 99 BPM | TEMPERATURE: 98.1 F

## 2024-04-03 DIAGNOSIS — K62.5 HEMORRHAGE OF RECTUM AND ANUS: Primary | ICD-10-CM

## 2024-04-03 PROCEDURE — 99204 OFFICE O/P NEW MOD 45 MIN: CPT | Performed by: INTERNAL MEDICINE

## 2024-04-03 RX ORDER — MAGNESIUM CITRATE
150 SOLUTION, ORAL ORAL ONCE
Qty: 1 EACH | Refills: 0 | Status: SHIPPED | OUTPATIENT
Start: 2024-04-03 | End: 2024-04-03

## 2024-04-03 RX ORDER — POLYETHYLENE GLYCOL 3350 17 G/17G
POWDER, FOR SOLUTION ORAL
Qty: 238 G | Refills: 0 | Status: SHIPPED | OUTPATIENT
Start: 2024-04-03

## 2024-04-03 RX ORDER — BISACODYL 5 MG/1
TABLET, DELAYED RELEASE ORAL
Qty: 4 TABLET | Refills: 0 | Status: SHIPPED | OUTPATIENT
Start: 2024-04-03

## 2024-04-03 ASSESSMENT — ENCOUNTER SYMPTOMS
ABDOMINAL PAIN: 0
DIARRHEA: 1
CONSTIPATION: 0
TROUBLE SWALLOWING: 0
BLOOD IN STOOL: 0
ABDOMINAL DISTENTION: 0
ANAL BLEEDING: 1
RECTAL PAIN: 1
VOMITING: 0
NAUSEA: 0

## 2024-04-03 NOTE — TELEPHONE ENCOUNTER
Procedure scheduled/Dr Bryson  Procedure: colonoscopy  Dx: rectal bleed  Date: 5/1/24  Time:11:00am/arrive 9:00am  Hospital: [facility] phone call:  Bowel Prep instructions given: Miralax/dulcolax/mag cit  In office/via phone: office  Clearance needed:none

## 2024-04-03 NOTE — PROGRESS NOTES
Reason for Referral:   Johanna Reyes, PA  [address]   [address]  [place] [address]    Chief Complaint   Patient presents with    New Patient     Rectal bleeding    Diarrhea     Patient co diarrhea     Other     Patient co rectal pain            HISTORY OF PRESENT ILLNESS: Ms.CARISSA PORTILLO Covarrubias is a 24 y.o. female , referred for evaluation of rectal bleeding   This young lady is here for the first time  Been having rectal bleeding on and off for the last few weeks  Large amount of blood in the stool not just on wiping  She said that when she is more constipated  Denied any rectal pain or irritation denied any protrusion of any hemorrhoids or any other lesions  Denied any significant cramps or pain  Denied any diarrhea but she does have mucus in her stool  No upper GI symptoms at all  The most recent labs she has will for 2022 with normal CBC normal liver enzymes.      Past Medical,Family, and Social History reviewed and does contribute to the patient presentingcondition.        I did review all the labs results available for the labs which were ordered by the primary care physician, and the other consultants, we search on [employer] at [facility] and all the available care everywhere epic    I did review all the imaging studies of the abdomen available on EMR, ordered by the primary care physician and the other consultant    I did review all the pathology from the biopsies done on the previous endoscopies        Patient's PMH/PSH,SH,PSYCH Hx, MEDs, ALLERGIES, and ROS were all reviewed and updated in the appropriate sections.    PAST MEDICAL HISTORY:  No past medical history on file.    Past Surgical History:   Procedure Laterality Date    CHOLECYSTECTOMY, LAPAROSCOPIC  10/25/2022    CHOLECYSTECTOMY LAPAROSCOPIC ROBOTIC WITH FIRELFY    CHOLECYSTECTOMY, LAPAROSCOPIC N/A 10/25/2022    CHOLECYSTECTOMY LAPAROSCOPIC ROBOTIC WITH FIRELFY performed by Emmanuel Dominguez DO at [facility] OR       CURRENT

## 2024-04-17 ENCOUNTER — HOSPITAL ENCOUNTER (OUTPATIENT)
Dept: PREADMISSION TESTING | Age: 24
Discharge: HOME | End: 2024-04-21

## 2024-04-17 VITALS — BODY MASS INDEX: 30.73 KG/M2 | WEIGHT: 180 LBS | HEIGHT: 64 IN

## 2024-04-17 NOTE — PROGRESS NOTES
Pre-op Instructions For Out-Patient Endoscopy Surgery    Medication Instructions:  Please stop herbs and any supplements now (includes vitamins and minerals).    Please contact your surgeon and prescribing physician for pre-op instructions for any blood thinners.    If you have inhalers/aerosol treatments at home, please use them the morning of your surgery and bring the inhalers with you to the hospital.    Please take the following medications the morning of your surgery with a sip of water:    none    Surgery Instructions:  After midnight before surgery:  Do not eat or drink anything, including water, mints, gum, and hard candy.  You may brush your teeth without swallowing.  No smoking, chewing tobacco, or street drugs.    Please shower or bathe before surgery.       Please do not wear any cologne, lotion, powder, jewelry, piercings, perfume, makeup, nail polish, hair accessories, or hair spray on the day of surgery.  Wear loose comfortable clothing.    Leave your valuables at home but bring a payment source for any after-surgery prescriptions you plan to fill at [facility].  Bring a storage case for any glasses/contacts.    An adult who is responsible for you MUST drive you home and should be with you for the first 24 hours after surgery.     The Day of Surgery:  Arrive at [facility] Surgery Entrance at the time directed by your surgeon and check in at the desk.     If you have a living will or healthcare power of , please bring a copy.    You will be taken to the pre-op holding area where you will be prepared for surgery.  A physical assessment will be performed by a nurse practitioner or house officer.  Your IV will be started and you will meet your anesthesiologist.    When you go to surgery, your family will be directed to the surgical waiting room, where the doctor should speak with them after your surgery.    After surgery, you will be taken to the recovery area.  When you

## 2024-04-29 NOTE — PRE-PROCEDURE INSTRUCTIONS
No answer, left message ?                             Unable to leave message ?    When were you told to arrive at hospital ?  -0900    Do you have a  ?-YES    Are you on any blood thinners ?  -NONE                   If yes when did you stop taking ?    Do you have your prep Rx filled and instruction ? -YES     Nothing to eat the day before , only clear liquids.-INSTRUCTED    Are you experiencing any covid symptoms ? -NONE    Do you have any infections or rash we should be aware of ?-NONE      Do you have the Hibiclens soap to use the night before and the morning of surgery ?-N/A    Nothing to eat or drink after midnight, only a sip of water to take any medication instructed to take the night before.-INSTRUCTED    Wear comfortable clothing, leave any valuables at home, remove any jewelry and body piercing .-INSTRUCTED

## 2024-04-30 ENCOUNTER — ANESTHESIA EVENT (OUTPATIENT)
Dept: ENDOSCOPY | Age: 24
End: 2024-04-30
Payer: COMMERCIAL

## 2024-05-01 ENCOUNTER — HOSPITAL ENCOUNTER (OUTPATIENT)
Age: 24
Setting detail: OUTPATIENT SURGERY
Discharge: HOME | End: 2024-05-01
Attending: INTERNAL MEDICINE | Admitting: INTERNAL MEDICINE
Payer: COMMERCIAL

## 2024-05-01 ENCOUNTER — ANESTHESIA (OUTPATIENT)
Dept: ENDOSCOPY | Age: 24
End: 2024-05-01
Payer: COMMERCIAL

## 2024-05-01 VITALS
OXYGEN SATURATION: 99 % | HEART RATE: 60 BPM | RESPIRATION RATE: 18 BRPM | WEIGHT: 180 LBS | DIASTOLIC BLOOD PRESSURE: 69 MMHG | SYSTOLIC BLOOD PRESSURE: 108 MMHG | TEMPERATURE: 97.5 F | BODY MASS INDEX: 30.73 KG/M2 | HEIGHT: 64 IN

## 2024-05-01 DIAGNOSIS — K62.5 RECTAL BLEEDING: ICD-10-CM

## 2024-05-01 PROCEDURE — 3609010300 HC COLONOSCOPY W/BIOPSY SINGLE/MULTIPLE: Performed by: INTERNAL MEDICINE

## 2024-05-01 PROCEDURE — 7100000010 HC PHASE II RECOVERY - FIRST 15 MIN: Performed by: INTERNAL MEDICINE

## 2024-05-01 PROCEDURE — 6360000002 HC RX W HCPCS: Performed by: NURSE ANESTHETIST, CERTIFIED REGISTERED

## 2024-05-01 PROCEDURE — 7100000011 HC PHASE II RECOVERY - ADDTL 15 MIN: Performed by: INTERNAL MEDICINE

## 2024-05-01 PROCEDURE — 88305 TISSUE EXAM BY PATHOLOGIST: CPT

## 2024-05-01 PROCEDURE — 2500000003 HC RX 250 WO HCPCS: Performed by: NURSE ANESTHETIST, CERTIFIED REGISTERED

## 2024-05-01 PROCEDURE — 2580000003 HC RX 258: Performed by: ANESTHESIOLOGY

## 2024-05-01 PROCEDURE — 2709999900 HC NON-CHARGEABLE SUPPLY: Performed by: INTERNAL MEDICINE

## 2024-05-01 PROCEDURE — 81025 URINE PREGNANCY TEST: CPT

## 2024-05-01 PROCEDURE — 2500000003 HC RX 250 WO HCPCS: Performed by: ANESTHESIOLOGY

## 2024-05-01 PROCEDURE — 3700000001 HC ADD 15 MINUTES (ANESTHESIA): Performed by: INTERNAL MEDICINE

## 2024-05-01 PROCEDURE — 3700000000 HC ANESTHESIA ATTENDED CARE: Performed by: INTERNAL MEDICINE

## 2024-05-01 RX ORDER — SODIUM CHLORIDE 9 MG/ML
INJECTION, SOLUTION INTRAVENOUS PRN
Status: DISCONTINUED | OUTPATIENT
Start: 2024-05-01 | End: 2024-05-01 | Stop reason: HOSPADM

## 2024-05-01 RX ORDER — SODIUM CHLORIDE, SODIUM LACTATE, POTASSIUM CHLORIDE, CALCIUM CHLORIDE 600; 310; 30; 20 MG/100ML; MG/100ML; MG/100ML; MG/100ML
INJECTION, SOLUTION INTRAVENOUS CONTINUOUS
Status: DISCONTINUED | OUTPATIENT
Start: 2024-05-01 | End: 2024-05-01 | Stop reason: HOSPADM

## 2024-05-01 RX ORDER — PROPOFOL 10 MG/ML
INJECTION, EMULSION INTRAVENOUS CONTINUOUS PRN
Status: DISCONTINUED | OUTPATIENT
Start: 2024-05-01 | End: 2024-05-01 | Stop reason: SDUPTHER

## 2024-05-01 RX ORDER — SODIUM CHLORIDE 0.9 % (FLUSH) 0.9 %
5-40 SYRINGE (ML) INJECTION EVERY 12 HOURS SCHEDULED
Status: DISCONTINUED | OUTPATIENT
Start: 2024-05-01 | End: 2024-05-01 | Stop reason: HOSPADM

## 2024-05-01 RX ORDER — PROPOFOL 10 MG/ML
INJECTION, EMULSION INTRAVENOUS PRN
Status: DISCONTINUED | OUTPATIENT
Start: 2024-05-01 | End: 2024-05-01 | Stop reason: SDUPTHER

## 2024-05-01 RX ORDER — LIDOCAINE HYDROCHLORIDE 10 MG/ML
1 INJECTION, SOLUTION EPIDURAL; INFILTRATION; INTRACAUDAL; PERINEURAL
Status: COMPLETED | OUTPATIENT
Start: 2024-05-01 | End: 2024-05-01

## 2024-05-01 RX ORDER — MIDAZOLAM HYDROCHLORIDE 1 MG/ML
INJECTION INTRAMUSCULAR; INTRAVENOUS PRN
Status: DISCONTINUED | OUTPATIENT
Start: 2024-05-01 | End: 2024-05-01 | Stop reason: SDUPTHER

## 2024-05-01 RX ORDER — ONDANSETRON 2 MG/ML
INJECTION INTRAMUSCULAR; INTRAVENOUS PRN
Status: DISCONTINUED | OUTPATIENT
Start: 2024-05-01 | End: 2024-05-01 | Stop reason: SDUPTHER

## 2024-05-01 RX ORDER — LIDOCAINE HYDROCHLORIDE 10 MG/ML
INJECTION, SOLUTION EPIDURAL; INFILTRATION; INTRACAUDAL; PERINEURAL PRN
Status: DISCONTINUED | OUTPATIENT
Start: 2024-05-01 | End: 2024-05-01 | Stop reason: SDUPTHER

## 2024-05-01 RX ORDER — SODIUM CHLORIDE 0.9 % (FLUSH) 0.9 %
5-40 SYRINGE (ML) INJECTION PRN
Status: DISCONTINUED | OUTPATIENT
Start: 2024-05-01 | End: 2024-05-01 | Stop reason: HOSPADM

## 2024-05-01 RX ADMIN — PROPOFOL 150 MCG/KG/MIN: 10 INJECTION, EMULSION INTRAVENOUS at 10:51

## 2024-05-01 RX ADMIN — MIDAZOLAM 2 MG: 1 INJECTION INTRAMUSCULAR; INTRAVENOUS at 10:45

## 2024-05-01 RX ADMIN — PROPOFOL 100 MG: 10 INJECTION, EMULSION INTRAVENOUS at 10:51

## 2024-05-01 RX ADMIN — ONDANSETRON 4 MG: 2 INJECTION INTRAMUSCULAR; INTRAVENOUS at 11:00

## 2024-05-01 RX ADMIN — LIDOCAINE HYDROCHLORIDE 50 MG: 10 INJECTION, SOLUTION EPIDURAL; INFILTRATION; INTRACAUDAL; PERINEURAL at 10:51

## 2024-05-01 RX ADMIN — LIDOCAINE HYDROCHLORIDE 1 ML: 10 INJECTION, SOLUTION EPIDURAL; INFILTRATION; INTRACAUDAL; PERINEURAL at 09:46

## 2024-05-01 RX ADMIN — SODIUM CHLORIDE, POTASSIUM CHLORIDE, SODIUM LACTATE AND CALCIUM CHLORIDE: 600; 310; 30; 20 INJECTION, SOLUTION INTRAVENOUS at 09:46

## 2024-05-01 ASSESSMENT — ENCOUNTER SYMPTOMS
SINUS PRESSURE: 0
SHORTNESS OF BREATH: 0
NAUSEA: 0
ABDOMINAL PAIN: 0

## 2024-05-01 ASSESSMENT — PAIN - FUNCTIONAL ASSESSMENT
PAIN_FUNCTIONAL_ASSESSMENT: NONE - DENIES PAIN
PAIN_FUNCTIONAL_ASSESSMENT: 0-10

## 2024-05-01 NOTE — OP NOTE
PROCEDURE NOTE    DATE OF PROCEDURE: 5/1/2024    SURGEON: Terence Bryson MD  Facility : [facility]  ASSISTANT: None  Anesthesia: MAC  PREOPERATIVE DIAGNOSIS:   Rectal bleeding    POSTOPERATIVE DIAGNOSIS: as described below    OPERATION: Total colonoscopy     ANESTHESIA: Moderate Sedation    ESTIMATED BLOOD LOSS: less than 50     COMPLICATIONS: None.     SPECIMENS:  Was Obtained:   The mucosa of the rectum is granular with tiny little nodules less than 2 mm each please see the image biopsies were taken        HISTORY: The patient is a 24 y.o. year old female with history of above preop diagnosis.  I recommended colonoscopy with possible biopsy or polypectomy and I explained the risk, benefits, expected outcome, and alternatives to the procedure.  Risks included but are not limited to bleeding, infection, respiratory distress, hypotension, and perforation of the colon and possibility of missing a lesion.  The patient understands and is in agreement.        The patient was counseled at length about the risks of bruce Covid-19 during their perioperative period and any recovery window from their procedure.  The patient was made aware that bruce Covid-19  may worsen their prognosis for recovering from their procedure  and lend to a higher morbidity and/or mortality risk.  All material risks, benefits, and reasonable alternatives including postponing the procedure were discussed. The patient does wish to proceed with the procedure at this time.       PROCEDURE: The patient was given IV conscious sedation.  The patient's SPO2 remained above 90% throughout the procedure.     The colonoscope was inserted per rectum and advanced under direct vision to the cecum without difficulty.      Post sedation note :The patient's SPO2 remained above 90% throughout the procedure.the vital signs remained stable , and no immediate complication form the procedure noted, patient will be ready for d/c when criteria is met

## 2024-05-01 NOTE — ANESTHESIA POSTPROCEDURE EVALUATION
Department of Anesthesiology  Postprocedure Note    Patient: MAISHA Covarrubias  MRN: [id number]  YOB: 2000  Date of evaluation: 5/1/2024    Procedure Summary       Date: 05/01/24 Room / Location: [place] [address] / [facility]    Anesthesia Start: 1045 Anesthesia Stop: 1121    Procedure: COLONOSCOPY RECTAL BIOPSY Diagnosis:       Rectal bleeding      (Rectal bleeding [K62.5])    Surgeons: Terence Bryson MD Responsible Provider: Ramone Motley MD    Anesthesia Type: general, TIVA ASA Status: 2            Anesthesia Type: No value filed.    Veena Phase I:      Veena Phase II: Veena Score: 10    Anesthesia Post Evaluation    Comments: POST- ANESTHESIA EVALUATION       Pt Name: MAISHA Covarrubias  MRN: [id number]  YOB: 2000  Date of evaluation: 5/1/2024  Time:  12:42 PM      /69   Pulse 60   Temp 97.5 °F (36.4 °C)   Resp 18   Ht 1.626 m (5' 4\")   Wt 81.6 kg (180 lb)   LMP 03/16/2024 (Approximate) Comment: urine pregnancy negative  SpO2 99%   BMI 30.90 kg/m²      Consciousness Level  Awake  Cardiopulmonary Status  Stable  Pain Adequately Treated YES  Nausea / Vomiting  NO  Adequate Hydration  YES  Anesthesia Related Complications NONE      Electronically signed by Ramone Motley MD on 5/1/2024 at 12:42 PM           No notable events documented.

## 2024-05-01 NOTE — H&P
HISTORY and PHYSICAL  [facility]       NAME:  MAISHA Covarrubias  MRN: [id number]   YOB: 2000   Date: 5/1/2024   Age: 24 y.o.  Gender: female       COMPLAINT AND PRESENT HISTORY:       MAISHA Covarrubias is 24 y.o.,   female, having a Diagnostic Colonoscopy, for hx of: Pre-Op Diagnosis Codes:     * Rectal bleeding      No prior Colonoscopy was done  this is her first time.   Patient is s/p Colon Surgery. Gallbladder in 2022.     Denies abdominal pain including bloating/gas.   Pt admits  changes in bowel habits. Pt states that she always has loose stools.    No diarrhea, constipation, pt admits to intermittent BRBPR.  blood in stools, black tarry stools.   Pt reports she was just told from ED  that she has hemorrhoids.    No changes in appetite and unintended weight loss. Denies any nausea or vomiting.   No  heartburn, indigestion or acid reflux.    Pt denies Family Hx of colon cancer.    Medical history reviewed:   Patient voices feeling well today. Denies any recent fever or chills, chest pain/pressure.  Pt reports no  SOB.     Patient has been NPO since midnight. No blood thinners .    Patient states  has completed and followed prescribed prep with clear outcome.      Patient denies any personal or family problems with anesthesia.    RECENT LABS, IMAGING AND TESTING     Lab Results   Component Value Date    WBC 6.1 09/30/2022    RBC 3.97 (L) 09/30/2022    HGB 12.5 09/30/2022    HCT 36.5 09/30/2022    MCV 91.7 09/30/2022    MCH 31.3 09/30/2022    MCHC 34.2 09/30/2022    RDW 12.4 09/30/2022     09/30/2022    MPV 6.8 09/30/2022        Lab Results   Component Value Date     09/30/2022    K 4.0 09/30/2022     09/30/2022    CO2 26 09/30/2022    BUN 11 09/30/2022    CREATININE 0.60 09/30/2022    GLUCOSE 78 09/30/2022    CALCIUM 9.6 09/30/2022    BILITOT 0.3 09/30/2022    ALKPHOS 46 09/30/2022    AST 18 09/30/2022    ALT 16 09/30/2022         PAST MEDICAL HISTORY     Past

## 2024-05-01 NOTE — ANESTHESIA PRE PROCEDURE
Department of Anesthesiology  Preprocedure Note       Name:  MAISHA Covarrubias   Age:  24 y.o.  :  2000                                          MRN:  [id number]         Date:  2024      Surgeon: Surgeon(s):  Terence Bryson MD    Procedure: Procedure(s):  COLONOSCOPY DIAGNOSTIC    Medications prior to admission:   Prior to Admission medications    Medication Sig Start Date End Date Taking? Authorizing Provider   bisacodyl 5 MG EC tablet Take as directed for bowel prep/colonoscopy 4/3/24   Terence Bryson MD   polyethylene glycol (MIRALAX) 17 GM/SCOOP powder Take per bowel prep instructions 4/3/24   Terence Bryson MD   norethindrone-ethinyl estradiol (JUNEL FE 1/20) 1-20 MG-MCG per tablet Take 1 tablet by mouth daily 10/5/23   Felicity Zamudio MD       Current medications:    Current Facility-Administered Medications   Medication Dose Route Frequency Provider Last Rate Last Admin   • sodium chloride flush 0.9 % injection 5-40 mL  5-40 mL IntraVENous 2 times per day Pam Riley MD       • sodium chloride flush 0.9 % injection 5-40 mL  5-40 mL IntraVENous PRN Pam Riley MD       • 0.9 % sodium chloride infusion   IntraVENous PRN Pam Riley MD       • lactated ringers IV soln infusion   IntraVENous Continuous Pam Riley  mL/hr at 24 0946 New Bag at 24 0946       Allergies:  No Known Allergies    Problem List:    Patient Active Problem List   Diagnosis Code   • Surveillance for birth control, oral contraceptives Z30.41   • Influenza vaccine refused Z28.21   • Epidermoid cyst of labia majora N90.7   • Helicobacter pylori gastritis K29.70, B96.81       Past Medical History:        Diagnosis Date   • Abdominal cramping        Past Surgical History:        Procedure Laterality Date   • CHOLECYSTECTOMY, LAPAROSCOPIC N/A 10/25/2022    CHOLECYSTECTOMY LAPAROSCOPIC ROBOTIC WITH FIRELFY performed by Emmanuel Dominguez DO at [facility] OR       Social History:    Social History     Tobacco

## 2024-05-02 LAB — SURGICAL PATHOLOGY REPORT: NORMAL

## 2024-05-08 DIAGNOSIS — K62.5 HEMORRHAGE OF RECTUM AND ANUS: ICD-10-CM

## 2024-08-06 ENCOUNTER — OFFICE VISIT (OUTPATIENT)
Dept: OBGYN CLINIC | Age: 24
End: 2024-08-06
Payer: COMMERCIAL

## 2024-08-06 ENCOUNTER — HOSPITAL ENCOUNTER (OUTPATIENT)
Age: 24
Setting detail: SPECIMEN
Discharge: HOME | End: 2024-08-06

## 2024-08-06 VITALS
DIASTOLIC BLOOD PRESSURE: 72 MMHG | HEIGHT: 64 IN | WEIGHT: 180 LBS | BODY MASS INDEX: 30.73 KG/M2 | SYSTOLIC BLOOD PRESSURE: 114 MMHG

## 2024-08-06 DIAGNOSIS — Z01.419 WELL FEMALE EXAM WITH ROUTINE GYNECOLOGICAL EXAM: Primary | ICD-10-CM

## 2024-08-06 DIAGNOSIS — L73.9 FOLLICULITIS: ICD-10-CM

## 2024-08-06 PROCEDURE — 99395 PREV VISIT EST AGE 18-39: CPT | Performed by: OBSTETRICS & GYNECOLOGY

## 2024-08-06 RX ORDER — SULFAMETHOXAZOLE AND TRIMETHOPRIM 800; 160 MG/1; MG/1
1 TABLET ORAL 2 TIMES DAILY
Qty: 14 TABLET | Refills: 0 | Status: SHIPPED | OUTPATIENT
Start: 2024-08-06 | End: 2024-08-13

## 2024-08-06 NOTE — PROGRESS NOTES
History and Physical  [facility]  [facility] [address]  [place], [place]  [address] [phone]   Fax [phone]  MAISHA Covarrubias  [birth year]24              24 y.o.  Chief Complaint   Patient presents with    vaginal bump       Patient's last menstrual period was 2024.             Primary Care Physician: Felicity Zamudio MD    The patient was seen and examined. She has no chief complaint today and is here for her annual exam.  Her bowels are regular. There are no voiding complaints. She denies any bloating.  She denies vaginal discharge and was counseled on STD's and the need for barrier contraception.     HPI : MAISHA Covarrubias is a 24 y.o. female     Pt presents to office for bump on labia. Pt had a previous occurrence and was folliculitis which was drained. Pt states went away with drainage and antibiotics. Pt states a bump is in her hairline where she shaves. Pt states it opened on its own and was draining. Pt is on OCs to regulate her periods. Pt has noticed weight gain with OCs. Pt wishes information on alternative options. Information on Kyleen as well as Mirena given to pt    no Bloating  no Early Satiety  no Unexplained weight change of more than 15 lbs  no  PMB  no  PCB  ________________________________________________________________________  OB History    Para Term  AB Living   0 0 0 0 0 0   SAB IAB Ectopic Molar Multiple Live Births   0 0 0 0 0 0     Past Medical History:   Diagnosis Date    Abdominal cramping                                                                    Past Surgical History:   Procedure Laterality Date    CHOLECYSTECTOMY, LAPAROSCOPIC N/A 10/25/2022    CHOLECYSTECTOMY LAPAROSCOPIC ROBOTIC WITH FIRELFY performed by Emmanuel Dominguez DO at [facility] OR    COLONOSCOPY N/A 2024    COLONOSCOPY RECTAL BIOPSY performed by Terence Bryson MD at [facility]     Family History   Problem Relation Age of Onset    High Blood

## 2024-08-14 LAB — CYTOLOGY REPORT: NORMAL

## 2024-09-07 DIAGNOSIS — Z30.41 SURVEILLANCE FOR BIRTH CONTROL, ORAL CONTRACEPTIVES: ICD-10-CM

## 2024-09-09 RX ORDER — NORETHINDRONE ACETATE AND ETHINYL ESTRADIOL 1MG-20(21)
1 KIT ORAL DAILY
Qty: 28 TABLET | Refills: 0 | Status: SHIPPED | OUTPATIENT
Start: 2024-09-09

## 2024-10-05 DIAGNOSIS — Z30.41 SURVEILLANCE FOR BIRTH CONTROL, ORAL CONTRACEPTIVES: ICD-10-CM

## 2024-10-07 RX ORDER — NORETHINDRONE ACETATE AND ETHINYL ESTRADIOL 1MG-20(21)
1 KIT ORAL DAILY
Qty: 28 TABLET | Refills: 0 | Status: SHIPPED | OUTPATIENT
Start: 2024-10-07 | End: 2024-10-10 | Stop reason: SDUPTHER

## 2024-10-07 RX ORDER — NORETHINDRONE ACETATE AND ETHINYL ESTRADIOL 1MG-20(21)
1 KIT ORAL DAILY
Qty: 28 TABLET | Refills: 0 | Status: SHIPPED | OUTPATIENT
Start: 2024-10-07

## 2024-10-07 NOTE — TELEPHONE ENCOUNTER
Please Approve or Refuse.  Send to Pharmacy per Pt's Request: [facility]      Next Visit Date:  10/5/2024   Last Visit Date: 10/5/2023    No results found for: \"LABA1C\"          ( goal A1C is < 7)   BP Readings from Last 3 Encounters:   08/06/24 114/72   05/01/24 108/69   10/05/23 126/70          (goal 120/80)  BUN   Date Value Ref Range Status   09/30/2022 11 6 - 20 mg/dL Final     Creatinine   Date Value Ref Range Status   09/30/2022 0.60 0.50 - 0.90 mg/dL Final     Potassium   Date Value Ref Range Status   09/30/2022 4.0 3.7 - 5.3 mmol/L Final

## 2024-10-07 NOTE — TELEPHONE ENCOUNTER
Please Approve or Refuse.  Send to Pharmacy per Pt's Request: [facility]      Next Visit Date:  called pt lvm   Last Visit Date: 10/5/2023    No results found for: \"LABA1C\"          ( goal A1C is < 7)   BP Readings from Last 3 Encounters:   08/06/24 114/72   05/01/24 108/69   10/05/23 126/70          (goal 120/80)  BUN   Date Value Ref Range Status   09/30/2022 11 6 - 20 mg/dL Final     Creatinine   Date Value Ref Range Status   09/30/2022 0.60 0.50 - 0.90 mg/dL Final     Potassium   Date Value Ref Range Status   09/30/2022 4.0 3.7 - 5.3 mmol/L Final

## 2024-10-09 ENCOUNTER — PATIENT MESSAGE (OUTPATIENT)
Dept: FAMILY MEDICINE CLINIC | Age: 24
End: 2024-10-09

## 2024-10-09 DIAGNOSIS — Z30.41 SURVEILLANCE FOR BIRTH CONTROL, ORAL CONTRACEPTIVES: ICD-10-CM

## 2024-10-10 RX ORDER — NORETHINDRONE ACETATE AND ETHINYL ESTRADIOL 1MG-20(21)
1 KIT ORAL DAILY
Qty: 180 TABLET | Refills: 1 | Status: SHIPPED | OUTPATIENT
Start: 2024-10-10

## 2024-11-01 DIAGNOSIS — Z30.41 SURVEILLANCE FOR BIRTH CONTROL, ORAL CONTRACEPTIVES: ICD-10-CM

## 2024-11-01 RX ORDER — NORETHINDRONE ACETATE AND ETHINYL ESTRADIOL 1MG-20(21)
1 KIT ORAL DAILY
Qty: 180 TABLET | Refills: 0 | Status: SHIPPED | OUTPATIENT
Start: 2024-11-01

## 2024-11-01 NOTE — TELEPHONE ENCOUNTER
Pt is requesting if she can get further refills for her medication.      Please Approve or Refuse.  Send to Pharmacy per Pt's Request: [facility]     Next Visit Date:  Visit date not found   Last Visit Date: 10/5/2023    No results found for: \"LABA1C\"          ( goal A1C is < 7)   BP Readings from Last 3 Encounters:   08/06/24 114/72   05/01/24 108/69   10/05/23 126/70          (goal 120/80)  BUN   Date Value Ref Range Status   09/30/2022 11 6 - 20 mg/dL Final     Creatinine   Date Value Ref Range Status   09/30/2022 0.60 0.50 - 0.90 mg/dL Final     Potassium   Date Value Ref Range Status   09/30/2022 4.0 3.7 - 5.3 mmol/L Final

## 2024-11-29 DIAGNOSIS — Z30.41 SURVEILLANCE FOR BIRTH CONTROL, ORAL CONTRACEPTIVES: ICD-10-CM

## 2024-12-01 RX ORDER — NORETHINDRONE ACETATE AND ETHINYL ESTRADIOL 1MG-20(21)
1 KIT ORAL DAILY
Qty: 28 TABLET | Refills: 0 | Status: SHIPPED | OUTPATIENT
Start: 2024-12-01

## 2024-12-01 NOTE — TELEPHONE ENCOUNTER
Please Approve or Refuse.  Send to Pharmacy per Pt's Request:      Next Visit Date:  Visit date not found   Last Visit Date: 10/5/2023    No results found for: \"LABA1C\"          ( goal A1C is < 7)   BP Readings from Last 3 Encounters:   08/06/24 114/72   05/01/24 108/69   10/05/23 126/70          (goal 120/80)  BUN   Date Value Ref Range Status   09/30/2022 11 6 - 20 mg/dL Final     Creatinine   Date Value Ref Range Status   09/30/2022 0.60 0.50 - 0.90 mg/dL Final     Potassium   Date Value Ref Range Status   09/30/2022 4.0 3.7 - 5.3 mmol/L Final

## 2025-01-09 ENCOUNTER — OFFICE VISIT (OUTPATIENT)
Dept: OBGYN CLINIC | Age: 25
End: 2025-01-09
Payer: COMMERCIAL

## 2025-01-09 VITALS
BODY MASS INDEX: 33.11 KG/M2 | WEIGHT: 206 LBS | DIASTOLIC BLOOD PRESSURE: 64 MMHG | SYSTOLIC BLOOD PRESSURE: 116 MMHG | HEIGHT: 66 IN

## 2025-01-09 DIAGNOSIS — N76.4 ABSCESS OF LABIA: Primary | ICD-10-CM

## 2025-01-09 DIAGNOSIS — L02.91 ABSCESS: Primary | ICD-10-CM

## 2025-01-09 PROCEDURE — 99213 OFFICE O/P EST LOW 20 MIN: CPT | Performed by: NURSE PRACTITIONER

## 2025-01-09 NOTE — PROGRESS NOTES
MAISHA Covarrubias  2025    YOB: 2000          The patient was seen today. She is here regarding c/o recurrent labia abscess/cyst. States 2 days ago it reoccurred and popped open. States she has it drained in the past and was told it was a cyst. Then it has reoccurred multiple times and was told an ingrown hair. States for the past 4 months she feels a lump under the scar tissue. Desires u/s to make sure just a cyst. Her bowels are regular and she is voiding without difficulty.     HPI:  MAISHA Covarrubias is a 24 y.o. female  recurrent labia abscess       OB History    Para Term  AB Living   0 0 0 0 0 0   SAB IAB Ectopic Molar Multiple Live Births   0 0 0 0 0 0       Past Medical History:   Diagnosis Date    Abdominal cramping        Past Surgical History:   Procedure Laterality Date    CHOLECYSTECTOMY, LAPAROSCOPIC N/A 10/25/2022    CHOLECYSTECTOMY LAPAROSCOPIC ROBOTIC WITH FIRELFY performed by Emmanuel Dominguez DO at [facility] OR    COLONOSCOPY N/A 2024    COLONOSCOPY RECTAL BIOPSY performed by Terence Bryson MD at [facility]       Family History   Problem Relation Age of Onset    High Blood Pressure Other     Diabetes Other        Social History     Socioeconomic History    Marital status: Single     Spouse name: Not on file    Number of children: Not on file    Years of education: Not on file    Highest education level: Not on file   Occupational History    Not on file   Tobacco Use    Smoking status: Never    Smokeless tobacco: Never   Vaping Use    Vaping status: Some Days    Substances: Nicotine   Substance and Sexual Activity    Alcohol use: Yes     Comment: social    Drug use: No    Sexual activity: Yes     Partners: Male     Birth control/protection: OCP   Other Topics Concern    Not on file   Social History Narrative    Not on file     Social Determinants of Health     Financial Resource Strain: Low Risk  (10/5/2023)    Overall Financial Resource Strain

## 2025-01-24 ENCOUNTER — OFFICE VISIT (OUTPATIENT)
Age: 25
End: 2025-01-24
Payer: COMMERCIAL

## 2025-01-24 VITALS
HEIGHT: 66 IN | OXYGEN SATURATION: 99 % | DIASTOLIC BLOOD PRESSURE: 82 MMHG | HEART RATE: 91 BPM | BODY MASS INDEX: 33.75 KG/M2 | TEMPERATURE: 97.7 F | WEIGHT: 210 LBS | SYSTOLIC BLOOD PRESSURE: 125 MMHG

## 2025-01-24 DIAGNOSIS — L73.2 HIDRADENITIS SUPPURATIVA: Primary | ICD-10-CM

## 2025-01-24 PROCEDURE — 99204 OFFICE O/P NEW MOD 45 MIN: CPT | Performed by: PHYSICIAN ASSISTANT

## 2025-01-24 RX ORDER — CLINDAMYCIN PHOSPHATE 10 UG/ML
LOTION TOPICAL
Qty: 60 ML | Refills: 5 | Status: SHIPPED | OUTPATIENT
Start: 2025-01-24

## 2025-01-24 RX ORDER — BENZOYL PEROXIDE 10 G/100G
SUSPENSION TOPICAL
Qty: 227 G | Refills: 5 | Status: SHIPPED | OUTPATIENT
Start: 2025-01-24

## 2025-01-24 NOTE — PROGRESS NOTES
Constitutional: Denies fevers, chills, and malaise.    PHYSICAL EXAM:   /82   Pulse 91   Temp 97.7 °F (36.5 °C)   Ht 1.676 m (5' 6\")   Wt 95.3 kg (210 lb)   LMP 01/01/2025   SpO2 99%   BMI 33.89 kg/m²     The patient is generally well appearing, well nourished, alert and conversational. Affect is normal.    Cutaneous Exam:  Physical Exam  Focused exam of axilla and external genitalia was performed    Facial covering was removed during examination.    Diagnoses/exam findings/medical history pertinent to this visit are listed below:    Assessment:   Diagnosis Orders   1. Hidradenitis suppurativa  clindamycin (CLEOCIN T) 1 % lotion    Benzoyl Peroxide (BENZAC AC) 10 % external wash           Plan:  1. Hidradenitis suppurativa (recurrent lesion on left vulva)  - chronic illness with progression and/or exacerbation   - clindamycin (CLEOCIN T) 1 % lotion; Apply to affected areas daily  Dispense: 60 mL; Refill: 5  - Benzoyl Peroxide (BENZAC AC) 10 % external wash; Use to wash affected areas, daily.  Dispense: 227 g; Refill: 5      RTC 3M    Future Appointments   Date Time Provider Department Center   8/11/2025 11:00 AM Yanet Hawthorne APRN - NP [facility]         There are no Patient Instructions on file for this visit.      Electronically signed by Jacinto Angulo PA-C on 1/24/25 at 8:17 AM EST

## (undated) DEVICE — ARM DRAPE

## (undated) DEVICE — MHPB BASIC LAP PACK: Brand: MEDLINE INDUSTRIES, INC.

## (undated) DEVICE — APPLICATOR MEDICATED 26 CC SOLUTION HI LT ORNG CHLORAPREP

## (undated) DEVICE — GOWN,SIRUS,NONRNF,SETINSLV,XL,20/CS: Brand: MEDLINE

## (undated) DEVICE — SOLUTION IV IRRIG POUR BRL 0.9% SODIUM CHL 2F7124

## (undated) DEVICE — STRAP,POSITIONING,KNEE/BODY,FOAM,4X60": Brand: MEDLINE

## (undated) DEVICE — DEFENDO AIR WATER SUCTION AND BIOPSY VALVE KIT FOR  OLYMPUS: Brand: DEFENDO AIR/WATER/SUCTION AND BIOPSY VALVE

## (undated) DEVICE — GLOVE ORANGE PI 7   MSG9070

## (undated) DEVICE — ENDO KIT W/SYRINGE: Brand: MEDLINE INDUSTRIES, INC.

## (undated) DEVICE — CANNULA SEAL

## (undated) DEVICE — ADHESIVE SKIN CLOSURE TOP 36 CC HI VISC DERMBND MINI

## (undated) DEVICE — ANCHOR TISSUE RETRIEVAL SYSTEM, BAG SIZE 125 ML, PORT SIZE 8 MM: Brand: ANCHOR TISSUE RETRIEVAL SYSTEM

## (undated) DEVICE — CLIP INT L POLYMER LOK LIG HEM O LOK

## (undated) DEVICE — PAD PT POS 36 IN SURGYPAD DISP

## (undated) DEVICE — FORCEPS BX L240CM WRK CHN 2.8MM STD CAP W/ NDL MIC MESH

## (undated) DEVICE — BLANKET WRM W29.9XL79.1IN UP BODY FORC AIR MISTRAL-AIR

## (undated) DEVICE — SUTURE MCRYL + SZ 4-0 L27IN ABSRB UD L19MM PS-2 3/8 CIR MCP426H

## (undated) DEVICE — NEEDLE INSUF L120MM DIA2MM DISP FOR PNEUMOPERI ENDOPATH

## (undated) DEVICE — SOLUTION ANTIFOG VIS SYS CLEARIFY LAPSCP

## (undated) DEVICE — GLOVE SURG SZ 75 L12IN FNGR THK79MIL GRN LTX FREE

## (undated) DEVICE — TIP COVER ACCESSORY

## (undated) DEVICE — BLADELESS OBTURATOR: Brand: WECK VISTA

## (undated) DEVICE — SYRINGE MED 10ML LUERLOCK TIP W/O SFTY DISP